# Patient Record
Sex: MALE | Race: BLACK OR AFRICAN AMERICAN | NOT HISPANIC OR LATINO | URBAN - METROPOLITAN AREA
[De-identification: names, ages, dates, MRNs, and addresses within clinical notes are randomized per-mention and may not be internally consistent; named-entity substitution may affect disease eponyms.]

---

## 2020-03-28 ENCOUNTER — EMERGENCY (EMERGENCY)
Facility: HOSPITAL | Age: 35
LOS: 1 days | Discharge: HOME | End: 2020-03-28
Attending: EMERGENCY MEDICINE
Payer: MEDICAID

## 2020-03-28 VITALS
OXYGEN SATURATION: 100 % | RESPIRATION RATE: 16 BRPM | SYSTOLIC BLOOD PRESSURE: 139 MMHG | DIASTOLIC BLOOD PRESSURE: 97 MMHG | TEMPERATURE: 99 F | HEART RATE: 88 BPM

## 2020-03-28 LAB
BASOPHILS # BLD AUTO: 0.03 K/UL — SIGNIFICANT CHANGE UP (ref 0–0.2)
BASOPHILS NFR BLD AUTO: 0.5 % — SIGNIFICANT CHANGE UP (ref 0–2)
EOSINOPHIL # BLD AUTO: 0 K/UL — SIGNIFICANT CHANGE UP (ref 0–0.5)
EOSINOPHIL NFR BLD AUTO: 0 % — SIGNIFICANT CHANGE UP (ref 0–6)
HCT VFR BLD CALC: 48 % — SIGNIFICANT CHANGE UP (ref 39–50)
HGB BLD-MCNC: 16.1 G/DL — SIGNIFICANT CHANGE UP (ref 13–17)
IMM GRANULOCYTES NFR BLD AUTO: 0.2 % — SIGNIFICANT CHANGE UP (ref 0–1.5)
LYMPHOCYTES # BLD AUTO: 1.57 K/UL — SIGNIFICANT CHANGE UP (ref 1–3.3)
LYMPHOCYTES # BLD AUTO: 27.3 % — SIGNIFICANT CHANGE UP (ref 13–44)
MCHC RBC-ENTMCNC: 27.3 PG — SIGNIFICANT CHANGE UP (ref 27–34)
MCHC RBC-ENTMCNC: 33.5 % — SIGNIFICANT CHANGE UP (ref 32–36)
MCV RBC AUTO: 81.4 FL — SIGNIFICANT CHANGE UP (ref 80–100)
MONOCYTES # BLD AUTO: 0.5 K/UL — SIGNIFICANT CHANGE UP (ref 0–0.9)
MONOCYTES NFR BLD AUTO: 8.7 % — SIGNIFICANT CHANGE UP (ref 2–14)
NEUTROPHILS # BLD AUTO: 3.64 K/UL — SIGNIFICANT CHANGE UP (ref 1.8–7.4)
NEUTROPHILS NFR BLD AUTO: 63.3 % — SIGNIFICANT CHANGE UP (ref 43–77)
NRBC # FLD: 0 K/UL — SIGNIFICANT CHANGE UP (ref 0–0)
PLATELET # BLD AUTO: 182 K/UL — SIGNIFICANT CHANGE UP (ref 150–400)
PMV BLD: 10.1 FL — SIGNIFICANT CHANGE UP (ref 7–13)
RBC # BLD: 5.9 M/UL — HIGH (ref 4.2–5.8)
RBC # FLD: 13.2 % — SIGNIFICANT CHANGE UP (ref 10.3–14.5)
WBC # BLD: 5.75 K/UL — SIGNIFICANT CHANGE UP (ref 3.8–10.5)
WBC # FLD AUTO: 5.75 K/UL — SIGNIFICANT CHANGE UP (ref 3.8–10.5)

## 2020-03-28 PROCEDURE — 71046 X-RAY EXAM CHEST 2 VIEWS: CPT | Mod: 26

## 2020-03-28 PROCEDURE — 93010 ELECTROCARDIOGRAM REPORT: CPT

## 2020-03-28 PROCEDURE — 99285 EMERGENCY DEPT VISIT HI MDM: CPT | Mod: 25

## 2020-03-28 RX ORDER — IBUPROFEN 200 MG
600 TABLET ORAL ONCE
Refills: 0 | Status: COMPLETED | OUTPATIENT
Start: 2020-03-28 | End: 2020-03-28

## 2020-03-28 RX ADMIN — Medication 600 MILLIGRAM(S): at 23:49

## 2020-03-28 NOTE — ED PROVIDER NOTE - NSFOLLOWUPCLINICS_GEN_ALL_ED_FT
[facility]  Cardiology  [address] [place] [id number]  Phone: [phone]  Fax:   Follow Up Time:

## 2020-03-28 NOTE — ED PROVIDER NOTE - PHYSICAL EXAMINATION
PHYSICAL EXAM:  GENERAL: non-toxic appearing; in no respiratory distress  HEAD: Atraumatic, Normocephalic;  NECK: No JVD; FROM  EYES: PERRL, EOMs intact b/l w/out deficits  CHEST/LUNG: CTAB no wheezes/rhonchi/rales  HEART: RRR no murmur/gallops/rubs  ABDOMEN: +BS, soft, NT, ND  EXTREMITIES: No LE edema, +2 radial pulses b/l  MUSCULOSKELETAL: FROM of all 4 extremities; tender to midsternum;   NERVOUS SYSTEM:  A&Ox3, No motor deficits or sensory deficits; CNII-XII intact; no focal neurologic deficits  SKIN:  No new rashes

## 2020-03-28 NOTE — ED PROVIDER NOTE - OBJECTIVE STATEMENT
35 yo M with no significant pmhx, presents to ED c/o midsternal CP x1 week, worsening since yesterday. pain is constant, non radiating, non exertional, worse w/ movement. pt also w/ rhinorrhea 2 days ago which has resolved. pt denies cough, fevers, chills, headache, nausea, vomiting, sob, diarrhea, abd pain, urinary sx, recent travel. pt admits to smoking marijuana daily

## 2020-03-28 NOTE — ED PROVIDER NOTE - ATTENDING CONTRIBUTION TO CARE
I performed a history and physical exam of the patient and discussed their management with the resident and /or advanced care provider. I reviewed the resident and /or ACP's note and agree with the documented findings and plan of care. My medical decision making and observations are found above.    .

## 2020-03-28 NOTE — ED PROVIDER NOTE - PATIENT PORTAL LINK FT
You can access the FollowMyHealth Patient Portal offered by [facility] by registering at the following website: [email or web address]. By joining [employer]’s FollowMyHealth portal, you will also be able to view your health information using other applications (apps) compatible with our system.

## 2020-03-28 NOTE — ED PROVIDER NOTE - NSFOLLOWUPINSTRUCTIONS_ED_ALL_ED_FT
Inferior and lateral T-wave inversion  T-wave inversion in the inferior and lateral leads is identified in 1.5–1.8% of adult and  athletes. However, T-wave inversions in the lateral leads are confined to males and have been identified in only 0.3%.11,14,15 In contrast, T-wave inversions are identified in 10% of black athletes (6% inferior leads and 4% lateral leads) but as with  athletes, T-wave inversion in the lateral leads is usually absent in black female athletes.    Although T-wave inversions in the lateral leads are identified in 4% of black athletes, we have observed two cases of aborted SCD in two young black football players with these repolarisation changes in the absence of a structural abnormality of the heart, indicating the presence of a subtle cardiomyopathy or an ion channel disorder. Comparison of the pattern of T-wave inversions in black athletes and black patients with HCM demonstrates that T-wave inversions in black HCM patients are usually confined to the lateral leads, being present in almost 80% of cases, whereas as T-wave inversion in leads V1–V4 is only observed in 3–4% of HCM patients compared with 12% of black athletes. More detailed genotype–phenotype studies are required in black athletes to elucidate the precise nature of marked repolarisation changes in the lateral leads.

## 2020-03-28 NOTE — ED PROVIDER NOTE - CLINICAL SUMMARY MEDICAL DECISION MAKING FREE TEXT BOX
Dick Lr MD. 34 M presents for midsternal CP x1 week, worsening today. denies sob, cough, fever, chills, n/v, radiation of cp. smokes marijuana daily. likely msk in nature, as pt is point tender to mid sternum. will do ekg, cxr, analgesia, reassess. Dick Lr MD. 34 M presents for midsternal CP x1 week, worsening today. denies sob, cough, fever, chills, n/v, radiation of cp. smokes marijuana daily. likely msk in nature, as pt is point tender to mid sternum. will do ekg, cxr, labs including trop, analgesia, reassess. Dick Lr MD. 34 M presents for midsternal CP x1 week, worsening today. denies sob, cough, fever, chills, n/v, radiation of cp. smokes marijuana daily. likely msk in nature, as pt is point tender to mid sternum. will do ekg, cxr, labs including trop, analgesia, reassess.    Haughey: pt looks well with mild left sided cp, preceded by pain to t4 region previously, smokes mj daily, looks incredibly well, low troponin, chest film normal.  given low trop will have pt f/u with outpatient cardioplogy.  given his ecg so he has a copy.

## 2020-03-29 LAB
ALBUMIN SERPL ELPH-MCNC: 4.3 G/DL — SIGNIFICANT CHANGE UP (ref 3.3–5)
ALP SERPL-CCNC: 47 U/L — SIGNIFICANT CHANGE UP (ref 40–120)
ALT FLD-CCNC: 12 U/L — SIGNIFICANT CHANGE UP (ref 4–41)
ANION GAP SERPL CALC-SCNC: 13 MMO/L — SIGNIFICANT CHANGE UP (ref 7–14)
AST SERPL-CCNC: 16 U/L — SIGNIFICANT CHANGE UP (ref 4–40)
BILIRUB SERPL-MCNC: 0.7 MG/DL — SIGNIFICANT CHANGE UP (ref 0.2–1.2)
BUN SERPL-MCNC: 13 MG/DL — SIGNIFICANT CHANGE UP (ref 7–23)
CALCIUM SERPL-MCNC: 9.5 MG/DL — SIGNIFICANT CHANGE UP (ref 8.4–10.5)
CHLORIDE SERPL-SCNC: 101 MMOL/L — SIGNIFICANT CHANGE UP (ref 98–107)
CO2 SERPL-SCNC: 23 MMOL/L — SIGNIFICANT CHANGE UP (ref 22–31)
CREAT SERPL-MCNC: 0.93 MG/DL — SIGNIFICANT CHANGE UP (ref 0.5–1.3)
GLUCOSE SERPL-MCNC: 83 MG/DL — SIGNIFICANT CHANGE UP (ref 70–99)
POTASSIUM SERPL-MCNC: 4.2 MMOL/L — SIGNIFICANT CHANGE UP (ref 3.5–5.3)
POTASSIUM SERPL-SCNC: 4.2 MMOL/L — SIGNIFICANT CHANGE UP (ref 3.5–5.3)
PROT SERPL-MCNC: 8.3 G/DL — SIGNIFICANT CHANGE UP (ref 6–8.3)
SODIUM SERPL-SCNC: 137 MMOL/L — SIGNIFICANT CHANGE UP (ref 135–145)
TROPONIN T, HIGH SENSITIVITY: < 6 NG/L — SIGNIFICANT CHANGE UP (ref ?–14)

## 2020-04-01 ENCOUNTER — EMERGENCY (EMERGENCY)
Facility: HOSPITAL | Age: 35
LOS: 1 days | Discharge: HOME | End: 2020-04-01
Attending: EMERGENCY MEDICINE | Admitting: EMERGENCY MEDICINE
Payer: MEDICAID

## 2020-04-01 VITALS
HEART RATE: 76 BPM | SYSTOLIC BLOOD PRESSURE: 157 MMHG | DIASTOLIC BLOOD PRESSURE: 99 MMHG | OXYGEN SATURATION: 100 % | RESPIRATION RATE: 16 BRPM | TEMPERATURE: 98 F

## 2020-04-01 VITALS
OXYGEN SATURATION: 100 % | DIASTOLIC BLOOD PRESSURE: 89 MMHG | HEART RATE: 63 BPM | RESPIRATION RATE: 18 BRPM | TEMPERATURE: 98 F | SYSTOLIC BLOOD PRESSURE: 120 MMHG

## 2020-04-01 LAB
ALBUMIN SERPL ELPH-MCNC: 3.7 G/DL — SIGNIFICANT CHANGE UP (ref 3.3–5)
ALP SERPL-CCNC: 45 U/L — SIGNIFICANT CHANGE UP (ref 40–120)
ALT FLD-CCNC: 18 U/L — SIGNIFICANT CHANGE UP (ref 4–41)
ANION GAP SERPL CALC-SCNC: 11 MMO/L — SIGNIFICANT CHANGE UP (ref 7–14)
AST SERPL-CCNC: 16 U/L — SIGNIFICANT CHANGE UP (ref 4–40)
BASOPHILS # BLD AUTO: 0.02 K/UL — SIGNIFICANT CHANGE UP (ref 0–0.2)
BASOPHILS NFR BLD AUTO: 0.5 % — SIGNIFICANT CHANGE UP (ref 0–2)
BILIRUB SERPL-MCNC: 0.2 MG/DL — SIGNIFICANT CHANGE UP (ref 0.2–1.2)
BUN SERPL-MCNC: 16 MG/DL — SIGNIFICANT CHANGE UP (ref 7–23)
CALCIUM SERPL-MCNC: 9.3 MG/DL — SIGNIFICANT CHANGE UP (ref 8.4–10.5)
CHLORIDE SERPL-SCNC: 101 MMOL/L — SIGNIFICANT CHANGE UP (ref 98–107)
CO2 SERPL-SCNC: 24 MMOL/L — SIGNIFICANT CHANGE UP (ref 22–31)
CREAT SERPL-MCNC: 0.92 MG/DL — SIGNIFICANT CHANGE UP (ref 0.5–1.3)
EOSINOPHIL # BLD AUTO: 0 K/UL — SIGNIFICANT CHANGE UP (ref 0–0.5)
EOSINOPHIL NFR BLD AUTO: 0 % — SIGNIFICANT CHANGE UP (ref 0–6)
GLUCOSE SERPL-MCNC: 128 MG/DL — HIGH (ref 70–99)
HCT VFR BLD CALC: 42.8 % — SIGNIFICANT CHANGE UP (ref 39–50)
HGB BLD-MCNC: 14.3 G/DL — SIGNIFICANT CHANGE UP (ref 13–17)
IMM GRANULOCYTES NFR BLD AUTO: 0.2 % — SIGNIFICANT CHANGE UP (ref 0–1.5)
LYMPHOCYTES # BLD AUTO: 1.6 K/UL — SIGNIFICANT CHANGE UP (ref 1–3.3)
LYMPHOCYTES # BLD AUTO: 37.3 % — SIGNIFICANT CHANGE UP (ref 13–44)
MCHC RBC-ENTMCNC: 27.6 PG — SIGNIFICANT CHANGE UP (ref 27–34)
MCHC RBC-ENTMCNC: 33.4 % — SIGNIFICANT CHANGE UP (ref 32–36)
MCV RBC AUTO: 82.6 FL — SIGNIFICANT CHANGE UP (ref 80–100)
MONOCYTES # BLD AUTO: 0.47 K/UL — SIGNIFICANT CHANGE UP (ref 0–0.9)
MONOCYTES NFR BLD AUTO: 11 % — SIGNIFICANT CHANGE UP (ref 2–14)
NEUTROPHILS # BLD AUTO: 2.19 K/UL — SIGNIFICANT CHANGE UP (ref 1.8–7.4)
NEUTROPHILS NFR BLD AUTO: 51 % — SIGNIFICANT CHANGE UP (ref 43–77)
NRBC # FLD: 0 K/UL — SIGNIFICANT CHANGE UP (ref 0–0)
PLATELET # BLD AUTO: 266 K/UL — SIGNIFICANT CHANGE UP (ref 150–400)
PMV BLD: 9.3 FL — SIGNIFICANT CHANGE UP (ref 7–13)
POTASSIUM SERPL-MCNC: 4 MMOL/L — SIGNIFICANT CHANGE UP (ref 3.5–5.3)
POTASSIUM SERPL-SCNC: 4 MMOL/L — SIGNIFICANT CHANGE UP (ref 3.5–5.3)
PROT SERPL-MCNC: 7.1 G/DL — SIGNIFICANT CHANGE UP (ref 6–8.3)
RBC # BLD: 5.18 M/UL — SIGNIFICANT CHANGE UP (ref 4.2–5.8)
RBC # FLD: 12.8 % — SIGNIFICANT CHANGE UP (ref 10.3–14.5)
SODIUM SERPL-SCNC: 136 MMOL/L — SIGNIFICANT CHANGE UP (ref 135–145)
TROPONIN T, HIGH SENSITIVITY: < 6 NG/L — SIGNIFICANT CHANGE UP (ref ?–14)
WBC # BLD: 4.29 K/UL — SIGNIFICANT CHANGE UP (ref 3.8–10.5)
WBC # FLD AUTO: 4.29 K/UL — SIGNIFICANT CHANGE UP (ref 3.8–10.5)

## 2020-04-01 PROCEDURE — 99285 EMERGENCY DEPT VISIT HI MDM: CPT

## 2020-04-01 PROCEDURE — 71046 X-RAY EXAM CHEST 2 VIEWS: CPT | Mod: 26

## 2020-04-01 RX ORDER — METOCLOPRAMIDE HCL 10 MG
10 TABLET ORAL ONCE
Refills: 0 | Status: COMPLETED | OUTPATIENT
Start: 2020-04-01 | End: 2020-04-01

## 2020-04-01 RX ORDER — ACETAMINOPHEN 500 MG
650 TABLET ORAL ONCE
Refills: 0 | Status: COMPLETED | OUTPATIENT
Start: 2020-04-01 | End: 2020-04-01

## 2020-04-01 RX ADMIN — Medication 650 MILLIGRAM(S): at 10:31

## 2020-04-01 RX ADMIN — Medication 10 MILLIGRAM(S): at 10:31

## 2020-04-01 NOTE — ED PROVIDER NOTE - PATIENT PORTAL LINK FT
You can access the FollowMyHealth Patient Portal offered by [facility] by registering at the following website: [email or web address]. By joining [employer]s FollowMyHealth portal, you will also be able to view your health information using other applications (apps) compatible with our system.

## 2020-04-01 NOTE — ED ADULT NURSE NOTE - OBJECTIVE STATEMENT
Pt c/o headache and CP. denies SOB, states last week had some nasal congestion and mild abdominal pain. 20 g line placed in RT Ac. labs sent, medicated as per order.

## 2020-04-01 NOTE — ED PROVIDER NOTE - PROGRESS NOTE DETAILS
MERA Fonseca: pt states that his symptoms have improved and currently does not have the headache or chest pain.

## 2020-04-01 NOTE — ED PROVIDER NOTE - ATTENDING CONTRIBUTION TO CARE
I performed a face to face evaluation of this patient and obtained a history and performed a full exam.  I agree with the history, physical exam and plan of the PA.    33 yo male no sig pmh presents to ED c/o headache and chest pain worsening over the past few days.  Previous visit recently with neg workup.  Exam aaox3, neck supple, no focal neuro deficit, lungs ctab, heart sounds no murmur or rub, no le swelling.  Low c/f sah as ha non-acute onset, not maximal in onset.  No meningismus. Low c/f PE, perc neg.  Low c/f acs as cp non-exertional, non-radiating, no nausea or diaphoresis.  Ekg c/w pericarditis vs. early repol.  Unable to compare to prior ekg.  Plan for cxr, labs, cxr, iv reglan, tylenol and reassess.  Dispo pending.

## 2020-04-01 NOTE — ED PROVIDER NOTE - CLINICAL SUMMARY MEDICAL DECISION MAKING FREE TEXT BOX
33 yo male c no sig pmhx presents to ED c/o headache and chest pain worsening over the past few days.   2nd visit to ED for same, r/o tension headache, atypical CP, will repeat ekg, cxr, labs, cxr, iv reglan, tylenol and reassess

## 2020-04-01 NOTE — ED PROVIDER NOTE - NSFOLLOWUPINSTRUCTIONS_ED_ALL_ED_FT
Rest, drink plenty of fluids.  Advance activity as tolerated.  Continue all previously prescribed medications as directed.  Follow up with your primary care physician in 48-72 hours- bring copies of your results.  Return to the ER for worsening or persistent symptoms, and/or ANY NEW OR CONCERNING SYMPTOMS. If you have issues obtaining follow up, please call: [phone] to obtain a doctor or specialist who takes your insurance in your area.  You may call [phone] to make an appointment with the internal medicine clinic.    Please take Ibuprofen 600mg every 8 hours for 7 days

## 2020-04-01 NOTE — ED ADULT TRIAGE NOTE - CHIEF COMPLAINT QUOTE
Pt c/o midsternal CP and HA x 3 days. States he was seen yesterday and referred to a cardiologist. Denies SOB, cough, fever/chills, N, V, vision changes. Denies pmhx

## 2020-04-01 NOTE — ED PROVIDER NOTE - OBJECTIVE STATEMENT
35 yo male c no sig pmhx presents to ED c/o headache and chest pain worsening over the past few days.  Pt was seen in ED 3 days ago for same symptoms, had a normal cxr and labs and was advised to follow up with a cardiologist.  Pt states he has been feeling worse and his headache kept him up all night.  Pt has not tried taking anything for the headache.  headache frontal region and notes neck discomfort as well.  Pt notes CP is substernal non radiating 5/10.   Pt notes has some nasal congestion.  Denies any fever, n/v, weakness, numbness, sob, abd pain, back pain.

## 2020-10-13 ENCOUNTER — EMERGENCY (EMERGENCY)
Facility: HOSPITAL | Age: 35
LOS: 0 days | Discharge: HOME | End: 2020-10-13
Attending: EMERGENCY MEDICINE
Payer: MEDICAID

## 2020-10-13 VITALS
OXYGEN SATURATION: 97 % | DIASTOLIC BLOOD PRESSURE: 84 MMHG | HEART RATE: 60 BPM | RESPIRATION RATE: 16 BRPM | SYSTOLIC BLOOD PRESSURE: 142 MMHG | TEMPERATURE: 97 F

## 2020-10-13 VITALS — WEIGHT: 160.06 LBS | HEART RATE: 88 BPM | OXYGEN SATURATION: 98 % | RESPIRATION RATE: 20 BRPM | HEIGHT: 68 IN

## 2020-10-13 DIAGNOSIS — R10.9 UNSPECIFIED ABDOMINAL PAIN: ICD-10-CM

## 2020-10-13 DIAGNOSIS — Z88.8 ALLERGY STATUS TO OTHER DRUGS, MEDICAMENTS AND BIOLOGICAL SUBSTANCES: ICD-10-CM

## 2020-10-13 DIAGNOSIS — R11.2 NAUSEA WITH VOMITING, UNSPECIFIED: ICD-10-CM

## 2020-10-13 DIAGNOSIS — N23 UNSPECIFIED RENAL COLIC: ICD-10-CM

## 2020-10-13 LAB
ALBUMIN SERPL ELPH-MCNC: 3.5 G/DL — SIGNIFICANT CHANGE UP (ref 3.3–5)
ALP SERPL-CCNC: 51 U/L — SIGNIFICANT CHANGE UP (ref 40–120)
ALT FLD-CCNC: 20 U/L — SIGNIFICANT CHANGE UP (ref 12–78)
ANION GAP SERPL CALC-SCNC: 8 MMOL/L — SIGNIFICANT CHANGE UP (ref 5–17)
AST SERPL-CCNC: 13 U/L — LOW (ref 15–37)
BASOPHILS # BLD AUTO: 0.02 K/UL — SIGNIFICANT CHANGE UP (ref 0–0.2)
BASOPHILS NFR BLD AUTO: 0.3 % — SIGNIFICANT CHANGE UP (ref 0–2)
BILIRUB SERPL-MCNC: 0.4 MG/DL — SIGNIFICANT CHANGE UP (ref 0.2–1.2)
BUN SERPL-MCNC: 17 MG/DL — SIGNIFICANT CHANGE UP (ref 7–23)
CALCIUM SERPL-MCNC: 8.4 MG/DL — LOW (ref 8.5–10.1)
CHLORIDE SERPL-SCNC: 106 MMOL/L — SIGNIFICANT CHANGE UP (ref 96–108)
CO2 SERPL-SCNC: 25 MMOL/L — SIGNIFICANT CHANGE UP (ref 22–31)
CREAT SERPL-MCNC: 1.17 MG/DL — SIGNIFICANT CHANGE UP (ref 0.5–1.3)
EOSINOPHIL # BLD AUTO: 0 K/UL — SIGNIFICANT CHANGE UP (ref 0–0.5)
EOSINOPHIL NFR BLD AUTO: 0 % — SIGNIFICANT CHANGE UP (ref 0–6)
GLUCOSE SERPL-MCNC: 134 MG/DL — HIGH (ref 70–99)
HCT VFR BLD CALC: 44.9 % — SIGNIFICANT CHANGE UP (ref 39–50)
HGB BLD-MCNC: 15.1 G/DL — SIGNIFICANT CHANGE UP (ref 13–17)
IMM GRANULOCYTES NFR BLD AUTO: 0.3 % — SIGNIFICANT CHANGE UP (ref 0–1.5)
LACTATE SERPL-SCNC: 1.7 MMOL/L — SIGNIFICANT CHANGE UP (ref 0.7–2)
LIDOCAIN IGE QN: 152 U/L — SIGNIFICANT CHANGE UP (ref 73–393)
LYMPHOCYTES # BLD AUTO: 2.15 K/UL — SIGNIFICANT CHANGE UP (ref 1–3.3)
LYMPHOCYTES # BLD AUTO: 29.3 % — SIGNIFICANT CHANGE UP (ref 13–44)
MCHC RBC-ENTMCNC: 26.6 PG — LOW (ref 27–34)
MCHC RBC-ENTMCNC: 33.6 GM/DL — SIGNIFICANT CHANGE UP (ref 32–36)
MCV RBC AUTO: 79 FL — LOW (ref 80–100)
MONOCYTES # BLD AUTO: 0.56 K/UL — SIGNIFICANT CHANGE UP (ref 0–0.9)
MONOCYTES NFR BLD AUTO: 7.6 % — SIGNIFICANT CHANGE UP (ref 2–14)
NEUTROPHILS # BLD AUTO: 4.59 K/UL — SIGNIFICANT CHANGE UP (ref 1.8–7.4)
NEUTROPHILS NFR BLD AUTO: 62.5 % — SIGNIFICANT CHANGE UP (ref 43–77)
NRBC # BLD: 0 /100 WBCS — SIGNIFICANT CHANGE UP (ref 0–0)
PLATELET # BLD AUTO: 214 K/UL — SIGNIFICANT CHANGE UP (ref 150–400)
POTASSIUM SERPL-MCNC: 3.3 MMOL/L — LOW (ref 3.5–5.3)
POTASSIUM SERPL-SCNC: 3.3 MMOL/L — LOW (ref 3.5–5.3)
PROT SERPL-MCNC: 7.5 GM/DL — SIGNIFICANT CHANGE UP (ref 6–8.3)
RBC # BLD: 5.68 M/UL — SIGNIFICANT CHANGE UP (ref 4.2–5.8)
RBC # FLD: 14.3 % — SIGNIFICANT CHANGE UP (ref 10.3–14.5)
SODIUM SERPL-SCNC: 139 MMOL/L — SIGNIFICANT CHANGE UP (ref 135–145)
WBC # BLD: 7.34 K/UL — SIGNIFICANT CHANGE UP (ref 3.8–10.5)
WBC # FLD AUTO: 7.34 K/UL — SIGNIFICANT CHANGE UP (ref 3.8–10.5)

## 2020-10-13 PROCEDURE — G1004: CPT

## 2020-10-13 PROCEDURE — 99285 EMERGENCY DEPT VISIT HI MDM: CPT

## 2020-10-13 PROCEDURE — 74176 CT ABD & PELVIS W/O CONTRAST: CPT | Mod: 26,MG

## 2020-10-13 RX ORDER — OXYCODONE AND ACETAMINOPHEN 5; 325 MG/1; MG/1
1 TABLET ORAL ONCE
Refills: 0 | Status: DISCONTINUED | OUTPATIENT
Start: 2020-10-13 | End: 2020-10-13

## 2020-10-13 RX ORDER — SODIUM CHLORIDE 9 MG/ML
2300 INJECTION INTRAMUSCULAR; INTRAVENOUS; SUBCUTANEOUS ONCE
Refills: 0 | Status: COMPLETED | OUTPATIENT
Start: 2020-10-13 | End: 2020-10-13

## 2020-10-13 RX ORDER — ONDANSETRON 8 MG/1
4 TABLET, FILM COATED ORAL ONCE
Refills: 0 | Status: COMPLETED | OUTPATIENT
Start: 2020-10-13 | End: 2020-10-13

## 2020-10-13 RX ORDER — IBUPROFEN 200 MG
1 TABLET ORAL
Qty: 20 | Refills: 0
Start: 2020-10-13 | End: 2020-10-17

## 2020-10-13 RX ORDER — OXYCODONE AND ACETAMINOPHEN 5; 325 MG/1; MG/1
1 TABLET ORAL
Qty: 20 | Refills: 0
Start: 2020-10-13 | End: 2020-10-17

## 2020-10-13 RX ORDER — KETOROLAC TROMETHAMINE 30 MG/ML
30 SYRINGE (ML) INJECTION ONCE
Refills: 0 | Status: DISCONTINUED | OUTPATIENT
Start: 2020-10-13 | End: 2020-10-13

## 2020-10-13 RX ADMIN — Medication 30 MILLIGRAM(S): at 17:32

## 2020-10-13 RX ADMIN — OXYCODONE AND ACETAMINOPHEN 1 TABLET(S): 5; 325 TABLET ORAL at 23:00

## 2020-10-13 RX ADMIN — ONDANSETRON 4 MILLIGRAM(S): 8 TABLET, FILM COATED ORAL at 17:19

## 2020-10-13 RX ADMIN — SODIUM CHLORIDE 2300 MILLILITER(S): 9 INJECTION INTRAMUSCULAR; INTRAVENOUS; SUBCUTANEOUS at 17:19

## 2020-10-13 RX ADMIN — OXYCODONE AND ACETAMINOPHEN 1 TABLET(S): 5; 325 TABLET ORAL at 22:20

## 2020-10-13 NOTE — ED ADULT TRIAGE NOTE - CHIEF COMPLAINT QUOTE
Pt c/o left side abdominal pain , with vomiting that started last night , pt denies medical hx, diarrhea, fever and chills. pt threw up in triage, not cooperative unable to get bp in triage

## 2020-10-13 NOTE — ED PROVIDER NOTE - CARE PROVIDER_API CALL
SKYLER WILKINS  UROLOGY  [address], [address]  [place] [address]  Phone: [phone]  Fax: [phone]  Follow Up Time: 4-6 Days

## 2020-10-13 NOTE — ED ADULT NURSE NOTE - CINV DISCH TEACH PARTICIP
CAD (coronary artery disease)    Diabetes    Hyperlipidemia    Hypertension    PVD (peripheral vascular disease)
Patient

## 2020-10-13 NOTE — ED PROVIDER NOTE - CARE PROVIDERS DIRECT ADDRESSES
[email or web address]

## 2020-10-13 NOTE — ED ADULT NURSE REASSESSMENT NOTE - NS ED NURSE REASSESS COMMENT FT1
Assumed care at 1900, pt asleep on stretcher but easily arousable. Reassessed at 2100 and reports pain is relieved with no movement and has no complaints at this time. Assessments ongoing.

## 2020-10-13 NOTE — ED PROVIDER NOTE - PATIENT PORTAL LINK FT
You can access the FollowMyHealth Patient Portal offered by [facility] by registering at the following website: [email or web address]. By joining [employer]’s FollowMyHealth portal, you will also be able to view your health information using other applications (apps) compatible with our system.

## 2020-10-13 NOTE — ED PROVIDER NOTE - PHYSICAL EXAMINATION
Collins:  General: No distress.  Mentation at baseline.   HEENT: WNL  Chest/Lungs: CTAB, No wheeze, No retractions, No increased work of breathing, Normal rate  Heart: S1S2 RRR, No M/R/G, Pules equal Bilaterally in upper and lower extremities distally  Abd: soft, NT/ND, No guarding, No rebound.  No hernias, no palpable masses.  Extrem: FROM in all joints, no significant edema noted, No ulcers.  Cap refil < 2sec.  Skin: No rash noted, warm dry.  Neuro:  Grossly normal.  No difficulty ambulating. No focal deficits.  Psychiatric: No evidence of delusions. No SI/HI.

## 2021-04-22 ENCOUNTER — EMERGENCY (EMERGENCY)
Facility: HOSPITAL | Age: 36
LOS: 1 days | Discharge: HOME | End: 2021-04-22
Attending: STUDENT IN AN ORGANIZED HEALTH CARE EDUCATION/TRAINING PROGRAM | Admitting: STUDENT IN AN ORGANIZED HEALTH CARE EDUCATION/TRAINING PROGRAM
Payer: MEDICAID

## 2021-04-22 VITALS
DIASTOLIC BLOOD PRESSURE: 96 MMHG | SYSTOLIC BLOOD PRESSURE: 117 MMHG | RESPIRATION RATE: 18 BRPM | OXYGEN SATURATION: 99 % | TEMPERATURE: 98 F | HEART RATE: 82 BPM | HEIGHT: 68 IN

## 2021-04-22 VITALS
DIASTOLIC BLOOD PRESSURE: 88 MMHG | OXYGEN SATURATION: 98 % | RESPIRATION RATE: 16 BRPM | TEMPERATURE: 99 F | HEART RATE: 79 BPM | SYSTOLIC BLOOD PRESSURE: 149 MMHG

## 2021-04-22 PROCEDURE — 99284 EMERGENCY DEPT VISIT MOD MDM: CPT

## 2021-04-22 RX ORDER — IBUPROFEN 200 MG
600 TABLET ORAL ONCE
Refills: 0 | Status: COMPLETED | OUTPATIENT
Start: 2021-04-22 | End: 2021-04-22

## 2021-04-22 RX ORDER — DEXAMETHASONE 0.5 MG/5ML
10 ELIXIR ORAL ONCE
Refills: 0 | Status: COMPLETED | OUTPATIENT
Start: 2021-04-22 | End: 2021-04-22

## 2021-04-22 RX ORDER — DEXAMETHASONE 0.5 MG/5ML
10 ELIXIR ORAL ONCE
Refills: 0 | Status: DISCONTINUED | OUTPATIENT
Start: 2021-04-22 | End: 2021-04-22

## 2021-04-22 RX ADMIN — Medication 600 MILLIGRAM(S): at 22:03

## 2021-04-22 RX ADMIN — Medication 10 MILLIGRAM(S): at 22:17

## 2021-04-22 NOTE — ED PROVIDER NOTE - PHYSICAL EXAMINATION
GEN: NAD, awake, well appearing  HEENT: enlarged b/l tonsils, posterior oropharynx with erythema, no exudate, uvula midline   CHEST/LUNGS: Non-tachypneic, CTAB, bilateral breath sounds  CARDIAC: Non-tachycardic, s1s2, normal perfusion, no peripheral edema  ABDOMEN: Soft, NTND, No rebound/guarding  MSK: No joint tenderness, no gross deformity of extremities  SKIN: No rashes, no petechiae, no vesicles  NEURO: CN grossly intact, normal coordination, no focal motor or sensory deficits  PSYCH: Alert, appropriate, cooperative

## 2021-04-22 NOTE — ED PROVIDER NOTE - PATIENT PORTAL LINK FT
You can access the FollowMyHealth Patient Portal offered by [facility] by registering at the following website: [email or web address]. By joining [employer]’s FollowMyHealth portal, you will also be able to view your health information using other applications (apps) compatible with our system.

## 2021-04-22 NOTE — ED PROVIDER NOTE - NSFOLLOWUPINSTRUCTIONS_ED_ALL_ED_FT
Please follow up with your primary care doctor.  Take motrin 600mg every 6 hours as needed for pain.  Your COVID swab results will be texted to you with the phone # your provided when they result.   Return to the Emergency Department for any new or worsening symptoms

## 2021-04-22 NOTE — ED PROVIDER NOTE - CLINICAL SUMMARY MEDICAL DECISION MAKING FREE TEXT BOX
35M presenting with pharyngitis likely 2/2 viral etiology vs irritation from smoking. Will swab for covid given attendance to large event and treat symptomatically. Patient well appearing, no respiratory distress.

## 2021-04-22 NOTE — ED PROVIDER NOTE - OBJECTIVE STATEMENT
35M with no pmh presenting with 2 days of sore throat and generalized malaise. States that symptoms started after attending "420 marijuana smoking event" where he smoked all day and was exposed to a lot of second hand smoking. Denies fever, chills, cp, sob, nausea, vomiting, diarrhea, dysuria, headache

## 2021-04-22 NOTE — ED PROVIDER NOTE - NS ED ROS FT
GENERAL: No fever or chills  EYES: no change in vision  HEENT: sore throat   CARDIAC: no chest pain or palpitations  PULMONARY: no cough or SOB  GI: no abdominal pain, nausea, vomiting, diarrhea, or constipation   : No changes in urination  SKIN: no rashes  NEURO: no headache, numbness, or weakness  MSK: No joint pain

## 2021-04-22 NOTE — ED PROVIDER NOTE - NSFOLLOWUPCLINICS_GEN_ALL_ED_FT
[facility] - ENT  Otolaryngology (ENT)  [address] [place] [address]  Phone: [phone]  Fax:

## 2021-04-23 LAB — SARS-COV-2 RNA SPEC QL NAA+PROBE: SIGNIFICANT CHANGE UP

## 2021-04-26 ENCOUNTER — EMERGENCY (EMERGENCY)
Facility: HOSPITAL | Age: 36
LOS: 1 days | Discharge: HOME | End: 2021-04-26
Admitting: EMERGENCY MEDICINE
Payer: MEDICAID

## 2021-04-26 VITALS
TEMPERATURE: 99 F | OXYGEN SATURATION: 99 % | HEIGHT: 68 IN | DIASTOLIC BLOOD PRESSURE: 115 MMHG | SYSTOLIC BLOOD PRESSURE: 152 MMHG | HEART RATE: 95 BPM | RESPIRATION RATE: 18 BRPM

## 2021-04-26 DIAGNOSIS — J03.90 ACUTE TONSILLITIS, UNSPECIFIED: ICD-10-CM

## 2021-04-26 LAB
ALBUMIN SERPL ELPH-MCNC: 4.4 G/DL — SIGNIFICANT CHANGE UP (ref 3.3–5)
ALP SERPL-CCNC: 73 U/L — SIGNIFICANT CHANGE UP (ref 40–120)
ALT FLD-CCNC: 12 U/L — SIGNIFICANT CHANGE UP (ref 4–41)
ANION GAP SERPL CALC-SCNC: 9 MMOL/L — SIGNIFICANT CHANGE UP (ref 7–14)
APTT BLD: 32.1 SEC — SIGNIFICANT CHANGE UP (ref 27–36.3)
AST SERPL-CCNC: 12 U/L — SIGNIFICANT CHANGE UP (ref 4–40)
BASOPHILS # BLD AUTO: 0 K/UL — SIGNIFICANT CHANGE UP (ref 0–0.2)
BASOPHILS NFR BLD AUTO: 0 % — SIGNIFICANT CHANGE UP (ref 0–2)
BILIRUB SERPL-MCNC: 0.6 MG/DL — SIGNIFICANT CHANGE UP (ref 0.2–1.2)
BUN SERPL-MCNC: 17 MG/DL — SIGNIFICANT CHANGE UP (ref 7–23)
CALCIUM SERPL-MCNC: 9.6 MG/DL — SIGNIFICANT CHANGE UP (ref 8.4–10.5)
CHLORIDE SERPL-SCNC: 101 MMOL/L — SIGNIFICANT CHANGE UP (ref 98–107)
CO2 SERPL-SCNC: 28 MMOL/L — SIGNIFICANT CHANGE UP (ref 22–31)
CREAT SERPL-MCNC: 0.95 MG/DL — SIGNIFICANT CHANGE UP (ref 0.5–1.3)
EOSINOPHIL # BLD AUTO: 0.25 K/UL — SIGNIFICANT CHANGE UP (ref 0–0.5)
EOSINOPHIL NFR BLD AUTO: 2.7 % — SIGNIFICANT CHANGE UP (ref 0–6)
GLUCOSE SERPL-MCNC: 102 MG/DL — HIGH (ref 70–99)
HCT VFR BLD CALC: 49.1 % — SIGNIFICANT CHANGE UP (ref 39–50)
HGB BLD-MCNC: 16.1 G/DL — SIGNIFICANT CHANGE UP (ref 13–17)
IANC: 5.89 K/UL — SIGNIFICANT CHANGE UP (ref 1.5–8.5)
INR BLD: 1.18 RATIO — HIGH (ref 0.88–1.16)
LYMPHOCYTES # BLD AUTO: 1.38 K/UL — SIGNIFICANT CHANGE UP (ref 1–3.3)
LYMPHOCYTES # BLD AUTO: 15 % — SIGNIFICANT CHANGE UP (ref 13–44)
MCHC RBC-ENTMCNC: 27 PG — SIGNIFICANT CHANGE UP (ref 27–34)
MCHC RBC-ENTMCNC: 32.8 GM/DL — SIGNIFICANT CHANGE UP (ref 32–36)
MCV RBC AUTO: 82.2 FL — SIGNIFICANT CHANGE UP (ref 80–100)
MONOCYTES # BLD AUTO: 0.89 K/UL — SIGNIFICANT CHANGE UP (ref 0–0.9)
MONOCYTES NFR BLD AUTO: 9.7 % — SIGNIFICANT CHANGE UP (ref 2–14)
NEUTROPHILS # BLD AUTO: 5.61 K/UL — SIGNIFICANT CHANGE UP (ref 1.8–7.4)
NEUTROPHILS NFR BLD AUTO: 61.1 % — SIGNIFICANT CHANGE UP (ref 43–77)
PLATELET # BLD AUTO: 235 K/UL — SIGNIFICANT CHANGE UP (ref 150–400)
POTASSIUM SERPL-MCNC: 3.9 MMOL/L — SIGNIFICANT CHANGE UP (ref 3.5–5.3)
POTASSIUM SERPL-SCNC: 3.9 MMOL/L — SIGNIFICANT CHANGE UP (ref 3.5–5.3)
PROT SERPL-MCNC: 8 G/DL — SIGNIFICANT CHANGE UP (ref 6–8.3)
PROTHROM AB SERPL-ACNC: 13.4 SEC — SIGNIFICANT CHANGE UP (ref 10.6–13.6)
RBC # BLD: 5.97 M/UL — HIGH (ref 4.2–5.8)
RBC # FLD: 13.7 % — SIGNIFICANT CHANGE UP (ref 10.3–14.5)
SARS-COV-2 RNA SPEC QL NAA+PROBE: SIGNIFICANT CHANGE UP
SODIUM SERPL-SCNC: 138 MMOL/L — SIGNIFICANT CHANGE UP (ref 135–145)
WBC # BLD: 9.18 K/UL — SIGNIFICANT CHANGE UP (ref 3.8–10.5)
WBC # FLD AUTO: 9.18 K/UL — SIGNIFICANT CHANGE UP (ref 3.8–10.5)

## 2021-04-26 PROCEDURE — 99283 EMERGENCY DEPT VISIT LOW MDM: CPT

## 2021-04-26 PROCEDURE — 99218: CPT

## 2021-04-26 PROCEDURE — 70491 CT SOFT TISSUE NECK W/DYE: CPT | Mod: 26

## 2021-04-26 RX ORDER — ACETAMINOPHEN 500 MG
1000 TABLET ORAL ONCE
Refills: 0 | Status: COMPLETED | OUTPATIENT
Start: 2021-04-26 | End: 2021-04-26

## 2021-04-26 RX ORDER — KETOROLAC TROMETHAMINE 30 MG/ML
15 SYRINGE (ML) INJECTION EVERY 6 HOURS
Refills: 0 | Status: COMPLETED | OUTPATIENT
Start: 2021-04-26 | End: 2021-05-01

## 2021-04-26 RX ORDER — DEXAMETHASONE 0.5 MG/5ML
10 ELIXIR ORAL EVERY 8 HOURS
Refills: 0 | Status: COMPLETED | OUTPATIENT
Start: 2021-04-26 | End: 2021-04-27

## 2021-04-26 RX ORDER — LIDOCAINE 4 G/100G
15 CREAM TOPICAL ONCE
Refills: 0 | Status: COMPLETED | OUTPATIENT
Start: 2021-04-26 | End: 2021-04-26

## 2021-04-26 RX ORDER — DEXAMETHASONE 0.5 MG/5ML
10 ELIXIR ORAL ONCE
Refills: 0 | Status: COMPLETED | OUTPATIENT
Start: 2021-04-26 | End: 2021-04-26

## 2021-04-26 RX ADMIN — Medication 900 MILLIGRAM(S): at 12:19

## 2021-04-26 RX ADMIN — Medication 100 MILLIGRAM(S): at 11:23

## 2021-04-26 RX ADMIN — Medication 30 MILLILITER(S): at 19:40

## 2021-04-26 RX ADMIN — Medication 10 MILLIGRAM(S): at 12:19

## 2021-04-26 RX ADMIN — Medication 102 MILLIGRAM(S): at 18:15

## 2021-04-26 RX ADMIN — LIDOCAINE 15 MILLILITER(S): 4 CREAM TOPICAL at 10:18

## 2021-04-26 RX ADMIN — Medication 102 MILLIGRAM(S): at 09:41

## 2021-04-26 RX ADMIN — Medication 15 MILLIGRAM(S): at 15:52

## 2021-04-26 RX ADMIN — Medication 100 MILLIGRAM(S): at 20:13

## 2021-04-26 RX ADMIN — Medication 1000 MILLIGRAM(S): at 12:19

## 2021-04-26 RX ADMIN — Medication 15 MILLIGRAM(S): at 18:16

## 2021-04-26 RX ADMIN — Medication 400 MILLIGRAM(S): at 09:15

## 2021-04-26 RX ADMIN — Medication 15 MILLIGRAM(S): at 23:59

## 2021-04-26 NOTE — ED BEHAVIORAL HEALTH NOTE - BEHAVIORAL HEALTH NOTE
Writer contacted and spoke with pt’s significant other, Cam Rebolledo, at [phone]. Significant other states pt lives alone but that she has been staying with him. Pt has 10 year old daughter who lives with her maternal aunt full time. Pt visits when he is allowed to. Significant other reported child to be in a safe environment with aunt.     Significant other (s/o) reports pt was allegedly feeling a bit suicidal and that girlfriend wanted pt to be further monitored. Significant other reports that pt has disclosed prior mental health hx of wanting to hit himself and banging his head in past when depressed. S/o reports last known episode to be around Dec. 2020. S/o reports that she had witnessed pt saying that everybody hates me, was in mental breakdown, and started hitting himself with his hand and hitting himself with objects (Dec. 2020). S/o had to further console pt with pt responding well. Pt can become angry, irritable and lash out at himself.  S/o reports now today about 2 hours ago they had an argument on phone to which pt said “this is why I am going to kill myself” and then hung up the phone. S/o reports that this was the first time she had heard pt report SI since incident in December, but is aware that pt mentioned thoughts about a month ago to hospital staff earlier this morning. Significant other does not feel pt would admit plan to harm self as he would not want to be in a psych. hospital. Significant other reports that if he is discharged from the hospital she would be willing to pick him up. No reports concern for substance use. Pt has no access to firearms. No known outpatient mental health treatment. No AH/VH or psychosis known. No reported hx of violence against others. No reported HI intent or plan. Pt is showering. Pt sleeping a lot more than usual.

## 2021-04-26 NOTE — CONSULT NOTE ADULT - SUBJECTIVE AND OBJECTIVE BOX
CC: sore throat     HPI: 35 year old male with no PMH that presents to [facility] stating he has had tonsillitis with worsening Sore throat for several days. States he was evaluated at [facility] in the ED two days ago and was sent home on percocet and Ibuprofen. Had fever of 102 last PM with chills. Denies any SOB, Dyspnea, cough, rigors, weakness. Denies covid exposure.       PAST MEDICAL & SURGICAL HISTORY:  No pertinent past medical history    No significant past surgical history      Allergies    morphine (Rash)    Intolerances      MEDICATIONS  (STANDING):  clindamycin IVPB      clindamycin IVPB 900 milliGRAM(s) IV Intermittent every 8 hours    MEDICATIONS  (PRN):    ROS:   ENT: all negative except as noted in HPI   CV: denies palpitations  Pulm: denies SOB, cough, hemoptysis  GI: denies change in apetite, indigestion, n/v  : denies pertinent urinary symptoms, urgency  Neuro: denies numbness/tingling, loss of sensation  Psych: denies anxiety  MS: denies muscle weakness, instability  Heme: denies easy bruising or bleeding  Endo: denies heat/cold intolerance, excessive sweating  Vascular: denies LE edema    Vital Signs Last 24 Hrs  T(C): 37.2 (26 Apr 2021 12:06), Max: 37.4 (26 Apr 2021 07:38)  T(F): 99 (26 Apr 2021 12:06), Max: 99.4 (26 Apr 2021 07:38)  HR: 70 (26 Apr 2021 12:06) (70 - 95)  BP: 144/89 (26 Apr 2021 12:06) (144/89 - 152/115)  BP(mean): --  RR: 18 (26 Apr 2021 12:06) (18 - 18)  SpO2: 99% (26 Apr 2021 12:06) (99% - 99%)                          16.1   9.18  )-----------( 235      ( 26 Apr 2021 09:23 )             49.1    04-26    138  |  101  |  17  ----------------------------<  102<H>  3.9   |  28  |  0.95    Ca    9.6      26 Apr 2021 09:23    TPro  8.0  /  Alb  4.4  /  TBili  0.6  /  DBili  x   /  AST  12  /  ALT  12  /  AlkPhos  73  04-26   PT/INR - ( 26 Apr 2021 09:23 )   PT: 13.4 sec;   INR: 1.18 ratio         PTT - ( 26 Apr 2021 09:23 )  PTT:32.1 sec    PHYSICAL EXAM:  Gen: NAD  Skin: No rashes, bruises, or lesions  Head: Normocephalic, Atraumatic  Face: no edema, erythema, or fluctuance. Parotid glands soft without mass  Eyes: no scleral injection  Ears: Right - ear canal clear, TM intact without effusion or erythema. No evidence of any fluid drainage. No mastoid tenderness, erythema, or ear bulging            Left - ear canal clear, TM intact without effusion or erythema. No evidence of any fluid drainage. No mastoid tenderness, erythema, or ear bulging  Nose: Nares bilaterally patent, no discharge  Mouth: No Stridor / Drooling / Trismus.  Mucosa moist, tongue/uvula midline, BL tonsil enlargement with mild exudate. Soft palate flat, mildly enlarged R>L   Neck: Flat, supple, no lymphadenopathy, trachea midline, no masses  Lymphatic: No lymphadenopathy  Resp: breathing easily, no stridor  CV: no peripheral edema/cyanosis  GI: nondistended   Peripheral vascular: no JVD or edema  Neuro: facial nerve intact, no facial droop      IMAGING/ADDITIONAL STUDIES:     CT:    IMPRESSION:    1. Bilateral palatine tonsillitis with right-sided peritonsillar phlegmon/early abscess formation.    2. Additional adenoiditis and lingual tonsillitis.    3. Cervical lymphadenopathy which is likely reactive.    4. Multiple carious teeth. Correlate with dental exam.    5. Sinusitis most notably affecting the left maxillary sinus. Odontogenic etiology is considered.

## 2021-04-26 NOTE — PROGRESS NOTE ADULT - ATTENDING COMMENTS
Please page ENT resident to reevaluate prior to any discharge planning  Pt also will need f/u with ENT for possible tonsillectomy consideration and evaluation as out patient

## 2021-04-26 NOTE — ED CDU PROVIDER INITIAL DAY NOTE - MEDICAL DECISION MAKING DETAILS
36 y/o M no PMHx here c/o throat pain x 1 week, worsening x 3 days, associated w/ hoarseness and worsening odynophagia. Had a fever of 103F overnight, prompting visit today. Was seen in the ED at onset of sx, testing negative for COVID, given Decadron and Motrin. Denies shortness of breath, cough or any other complaints. Took Motrin 600mg prior to arrival. In ED, CT demonstrated "Bilateral palatine tonsillitis with right-sided peritonsillar phlegmon/early abscess", patient received decadron and clindamycin IV. Patient referred to ED for ENT evaluation, IV abx, and monitoring.

## 2021-04-26 NOTE — ED ADULT NURSE NOTE - CAS EDN DISCHARGE ASSESSMENT
Discharged by KEY Baez MLP/Alert and oriented to person, place and time/Patient baseline mental status/Awake

## 2021-04-26 NOTE — ED CDU PROVIDER INITIAL DAY NOTE - PROGRESS NOTE DETAILS
Pt seen by ENT team, state no drainable collection at this time. Recommend liquid diet and abx. Will reassess pt this evening. Viral Kramer MD: received call from ENT team stating that they are not recommending drainage at this time, but do continue to recommend antibiotics. Plan to reassess after IV abx, po challenge This patient was signed out to me by damaris Greene CDU PA, at 1900 hrs.  In the interim, pt objectively noted to be resting comfortably, no issues thus far, tolerating PO Jello, applesauce, and liquids orally.  Test results discussed with pt; case d/w ENT who states as long as pt is tolerating PO, can be discharged from their perspective.  Pt is being observed overnight; CDU care plan discussed with pt who verbalizes agreement with plan.  On exam, pt stable, verbalizing in full, clear, effortless sentences.  Tonsils are mildly hyperemic and enlarged bilaterally, R>L; there are no exudates; uvula is midline, tongue appears WNL, neck supple, no palpable mass or crepitus.  Of note, pt's girlfriend contacted ED in the 9o'clock hour; stated that she was on the phone with the patient, they got into a verbal argument and pt stated he was "just going to go kill himself".  Pt's girlfriend stated he hung up on her, she tried calling him back, but he wasn't answering the phone, so she became concerned and called the ED.  I asked the patient about this interaction; pt states he made the statement out of anger and denies that he would hurt himself, has a plan to hurt himself, has ever tried to hurt himself in the past, or has any thought/plan to hurt anyone else.  I discussed this with ED attending Dr. Jin, who advised calling a psych consult; the covering psychiatry attending was notified of consult; ED ANM is aware of all.  Pt. stable at this time, pleasant, friendly, and cooperative with staff.  Will monitor pt closely for any change in status.  Psych consult pending.

## 2021-04-26 NOTE — ED PROVIDER NOTE - OBJECTIVE STATEMENT
34 y/o M no PMHx here c/o throat pain x 1 week, worsening x 3 days, associated w/ hoarseness and worsening odynophagia. Had a fever of 103F overnight, prompting visit today. Was seen in the ED at onset of sx, testing negative for COVID, given Decadron and Motrin. Denies shortness of breath, cough or any other complaints. Took Motrin 600mg prior to arrival.

## 2021-04-26 NOTE — ED CDU PROVIDER INITIAL DAY NOTE - PHYSICAL EXAMINATION
*GEN:   speaking in full sentences, no muffled voice, in no acute distress, AOx3  *EYES:   pupils equally round and reactive to light, extra-occular movements intact  *HEENT:   mild cervical LAD; bilat tonsillar swelling and irritaiton; airway patent  *CV:   regular rate and rhythm  *RESP:   clear to auscultation bilaterally, non-labored  *ABD:   soft, non-tender  *:   no cva/flank tenderness  *EXTREM:   no MSK tenderness, full ROM throughout, no leg swelling  *SKIN:   dry, intact  *NEURO:   AOx3, no focal weakness or loss of sensation

## 2021-04-26 NOTE — ED CDU PROVIDER INITIAL DAY NOTE - ATTENDING CONTRIBUTION TO CARE
Williams NIX: I agree with the above provided history and exam    I Viral Kramer MD performed a history and physical exam of the patient and discussed their management with the resident and /or advanced care provider. I reviewed the resident and /or ACP's note and agree with the documented findings and plan of care. My medical decision making and observations are found above.

## 2021-04-26 NOTE — ED PROVIDER NOTE - THROAT FINDINGS
R>L tonsillar edema, concerning for R PTA/no exudate/THROAT RED/UVULA DEVIATED LEFT/HOARSE/MUFFLED VOICE

## 2021-04-26 NOTE — ED CDU PROVIDER INITIAL DAY NOTE - OBJECTIVE STATEMENT
34 y/o M no PMHx here c/o throat pain x 1 week, worsening x 3 days, associated w/ hoarseness and worsening odynophagia. Had a fever of 103F overnight, prompting visit today. Was seen in the ED at onset of sx, testing negative for COVID, given Decadron and Motrin. Denies shortness of breath, cough or any other complaints. Took Motrin 600mg prior to arrival. In ED, CT demonstrated "Bilateral palatine tonsillitis with right-sided peritonsillar phlegmon/early abscess", patient received decadron and clindamycin IV. Patient referred to ED for ENT evaluation, IV abx, and monitoring.

## 2021-04-26 NOTE — CONSULT NOTE ADULT - PROBLEM SELECTOR RECOMMENDATION 9
1. Clindamycin    2. Decadron 8 mg IV  3. F/U CBC  4. Pain control  5. IVF, encourage PO fluids  6. No room is available to attempt drainage in ED at this time, unsure if theres a drainable collection.. ED will page ENT when room becomes available. Discussed case with ENT resident and Attending Dr. Fonseca

## 2021-04-26 NOTE — ED PROVIDER NOTE - CLINICAL SUMMARY MEDICAL DECISION MAKING FREE TEXT BOX
34 y/o M no PMHx here c/o throat pain x 1 week, worsening x 3 days, associated w/ hoarseness and worsening odynophagia. Concern for PTA. Plan labs, decadron, tylenol for pain (took Advil before arrival), CT neck, ent c/s

## 2021-04-26 NOTE — PROGRESS NOTE ADULT - SUBJECTIVE AND OBJECTIVE BOX
STATUS POST:  35y Male w R intratonsillar abscess. seen with attending.     Vital Signs Last 24 Hrs  T(C): 36.8 (26 Apr 2021 14:54), Max: 37.4 (26 Apr 2021 07:38)  T(F): 98.3 (26 Apr 2021 14:54), Max: 99.4 (26 Apr 2021 07:38)  HR: 66 (26 Apr 2021 14:54) (66 - 95)  BP: 141/99 (26 Apr 2021 14:54) (141/99 - 152/115)  BP(mean): --  RR: 15 (26 Apr 2021 14:54) (15 - 18)  SpO2: 97% (26 Apr 2021 14:54) (97% - 99%)    General: AAOx3, resting in bed, comfortable  Pulm: Nonlabored breathing, no respiratory distress  no sig trismus, no restricted ROM neck  no voice change or hot potato voice  R tonsil erythematous. soft palate no erythema or bulging    A/P: 34yo M w R tonsillar abscess  - IV abx clinda  - decadron prn  - encourage Po intake  - may dc if pt able to tolerate PO. dc on 10-14d clindamycin  - no f/up ENT needed unless worsening symptoms. Call [phone] for appointment.     ----------------------------------------------  Babak Schofield MD  Resident  Department of Otolaryngology - Head and Neck Surgery  Adult Page #[phone]  Peds Page #[phone] STATUS POST:  35y Male w R intratonsillar abscess. seen with attending.     Vital Signs Last 24 Hrs  T(C): 36.8 (26 Apr 2021 14:54), Max: 37.4 (26 Apr 2021 07:38)  T(F): 98.3 (26 Apr 2021 14:54), Max: 99.4 (26 Apr 2021 07:38)  HR: 66 (26 Apr 2021 14:54) (66 - 95)  BP: 141/99 (26 Apr 2021 14:54) (141/99 - 152/115)  BP(mean): --  RR: 15 (26 Apr 2021 14:54) (15 - 18)  SpO2: 97% (26 Apr 2021 14:54) (97% - 99%)    General: AAOx3, resting in bed, comfortable  Pulm: Nonlabored breathing, no respiratory distress  no sig trismus, no restricted ROM neck  no voice change or hot potato voice  R tonsil erythematous. soft palate no erythema or bulging    A/P: 36yo M w R early  tonsillar abscess  - IV abx clinda  - decadron prn  - clear liquid as tolerated  - Needs to receive IV antibiotic and reevaluated again for I & D reevaluation.     ----------------------------------------------  Babak Schofield MD  Resident  Department of Otolaryngology - Head and Neck Surgery  Adult Page #[phone]  Peds Page #[phone]

## 2021-04-26 NOTE — ED BEHAVIORAL HEALTH NOTE - BEHAVIORAL HEALTH NOTE
Writer outreached emergency contact listed as pt’s mother, Mariel Oden, at [phone]. Pt’s mother unaware of pt coming to ED reports pt to live on his own. Mother unaware of any other close contact of pt. Mother reports she last spoke with pt by phone about a week ago Pt at time had no reported medical complaints. No past mental health hx or concerns reported. Pt said to be working at a [employer] on [address]. No past psychiatric admissions reported. No known outpatient treatment. No known substance use. No known concerns for pt’s safety reported. No report of past psychiatric symptoms or SI/HI intent or plan.

## 2021-04-26 NOTE — ED ADULT TRIAGE NOTE - CHIEF COMPLAINT QUOTE
throat pain     c/o throat pain, fever since Tuesday. was seen 3 days ago for same.  was told to f/u with ent for possible removal of tonsils but unable to obtain appointment.  states pain worse. fever- tmax 103.  voice raspier than normal but speaking freely in full sentences. no drooling, stridor, sob.  states bp is always high when in hospital.  took  Motrin 600mg at 5am.

## 2021-04-27 VITALS
TEMPERATURE: 98 F | SYSTOLIC BLOOD PRESSURE: 143 MMHG | OXYGEN SATURATION: 99 % | HEART RATE: 62 BPM | RESPIRATION RATE: 15 BRPM | DIASTOLIC BLOOD PRESSURE: 94 MMHG

## 2021-04-27 DIAGNOSIS — F43.29 ADJUSTMENT DISORDER WITH OTHER SYMPTOMS: ICD-10-CM

## 2021-04-27 PROCEDURE — 90792 PSYCH DIAG EVAL W/MED SRVCS: CPT

## 2021-04-27 PROCEDURE — 99217: CPT

## 2021-04-27 RX ADMIN — Medication 15 MILLIGRAM(S): at 05:39

## 2021-04-27 RX ADMIN — Medication 100 MILLIGRAM(S): at 10:56

## 2021-04-27 RX ADMIN — Medication 900 MILLIGRAM(S): at 11:30

## 2021-04-27 RX ADMIN — Medication 15 MILLIGRAM(S): at 00:27

## 2021-04-27 RX ADMIN — Medication 15 MILLIGRAM(S): at 10:57

## 2021-04-27 RX ADMIN — Medication 15 MILLIGRAM(S): at 11:30

## 2021-04-27 RX ADMIN — Medication 102 MILLIGRAM(S): at 02:19

## 2021-04-27 RX ADMIN — Medication 100 MILLIGRAM(S): at 03:48

## 2021-04-27 NOTE — ED BEHAVIORAL HEALTH ASSESSMENT NOTE - NSACTIVEVENT_PSY_ALL_CORE
Triggering events leading to humiliation, shame, and/or despair (e.g., Loss of relationship, financial or health status) (real or anticipated)/Chronic pain or other acute medical condition

## 2021-04-27 NOTE — ED BEHAVIORAL HEALTH ASSESSMENT NOTE - SUMMARY
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 Patient reports smoking weed daily, drinks ETOH mostly on the weekends, denies any rehab or detox programs, denies tobacco use. Hx of arrest for "blasting music too loud", no current charges. Patient bib self for throat pain , found to have tonsillar abscess currently is in CDU for  medical observation . Psychiatry consult was requested after pt's girlfriend contacted Ed team informing that pt expressed si to her on the phone.  off note pt was seen by SBIRT earlier today and recommendations were placed.  Patient on assessment is fully engaged, well related, no acute mood sx are elicited or observed . Pt does not emet criteria for MDD. He made early statements of si , in the context of the argument with his girlfriend, denies any plan or intent , had fleeting passive si thoughts in recent weeks due to stress but again with no plan or intent. Pt has multiple protective factors including : daughter, employed, in the relationship, supportive family. No psychosis is present. Pt does not warrant involuntary admission , due to lack of acute concerns for pt's safety. Pt is cleared psychiatrically.

## 2021-04-27 NOTE — ED CDU PROVIDER SUBSEQUENT DAY NOTE - MEDICAL DECISION MAKING DETAILS
IV hydration, Decadron, clindamycin, supportive care, generalized observation care / monitoring; ENT is following pt.  Psychiatry cleared pt (see HPI above). Duong: IV hydration, Decadron, clindamycin, supportive care, generalized observation care / monitoring; ENT is following pt for tonsillar abscess/phlegmon.  Psychiatry cleared pt (see HPI above).

## 2021-04-27 NOTE — ED BEHAVIORAL HEALTH ASSESSMENT NOTE - HPI (INCLUDE ILLNESS QUALITY, SEVERITY, DURATION, TIMING, CONTEXT, MODIFYING FACTORS, ASSOCIATED SIGNS AND SYMPTOMS)
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 Patient reports smoking weed daily, drinks ETOH mostly on the weekends, denies any rehab or detox programs, denies tobacco use. Hx of arrest for "blasting music too loud", no current charges. Patient bib self for throat pain , found to have tonsillar abscess currently is in CDU for  medical observation . Psychiatry consult was requested after pt's girlfriend contacted Ed team informing that pt expressed si to her on the phone.  off note pt was seen by SBIRT earlier today and recommendations were placed.    On assessment pt is calm, cooperative well related. He reports he is in the hospital all day , feels frustrated , and was talking to his girlfriend, who he felt was not supportive by her  and "not being here for me ", they were arguing . During the argument he reports he admits making a statement "I better off not being around" and hung up the phone. Patient states he said it out of anger, and frustration  and had no actual thoughts of si/i/p. He denies any current passive or active si at this time.   Pt reports he has been under a lot fo stress due to work and has been working a lot and felt overwhelmed 2 weeks ago and had a fleeting thought of passive si "I don't want to be here, so I don't have responsibility anymore". He states the thought was fleeting and he had no plan or intent. He denies any persistent feeling of sadness , denies any disturbance in sleep, appetite , energy level other than the past couple of days because he has not felt well physically. Patient denies any feeling of hopelessness . No hi/i/p. No psychotic or manic sx . Pt is future oriented, reports he has his daughter and wants to be a good dad to her .   see  note for collateral.

## 2021-04-27 NOTE — ED BEHAVIORAL HEALTH ASSESSMENT NOTE - DESCRIPTION
calm, cooperative  Vital Signs Last 24 Hrs  T(C): 36.7 (26 Apr 2021 21:25), Max: 37.4 (26 Apr 2021 07:38)  T(F): 98.1 (26 Apr 2021 21:25), Max: 99.4 (26 Apr 2021 07:38)  HR: 68 (26 Apr 2021 21:25) (66 - 95)  BP: 121/90 (26 Apr 2021 21:25) (121/90 - 152/115)  BP(mean): --  RR: 16 (26 Apr 2021 21:25) (15 - 18)  SpO2: 98% (26 Apr 2021 21:25) (97% - 99%) employed, has 1 daughter, domicile alone. tonsillar abcess

## 2021-04-27 NOTE — CHART NOTE - NSCHARTNOTEFT_GEN_A_CORE
ENT brief note:  Patient seen and evaluated at bedside. Subjective improvement in symptoms. Tolerating PO.  Intraoral exam with L medial tonsil deviation but no erythema. Airway patent.   -PO Clindamycin; complete 10 day total course of Abx  - f/u with ENT for possible tonsillectomy consideration and evaluation as outpatient after treatment course  -OK for discharge from ENT perspective

## 2021-04-27 NOTE — ED CDU PROVIDER SUBSEQUENT DAY NOTE - PHYSICAL EXAMINATION
CONSTITUTIONAL:  Well appearing, awake, alert, oriented to person, place, time/situation and in no apparent distress.  Pt. is objectively comfortable appearing and verbalizing in full, clear, effortless sentences.  ENMT: NC/AT.  Airway patent.  Nasal mucosa clear.  Moist mucous membranes.  Tonsils mildly hyperemic, enlarged, R>L, uvula midline.  No tonsillar exudates noted.  Neck supple.  No cervical masses or crepitus noted.  EYES:  Clear OU.  CARDIAC:  Normal rate, regular rhythm.  Heart sounds S1 S2.  No murmurs, gallops, or rubs.  RESPIRATORY:  Breath sounds clear and equal bilaterally.  No wheezes, no rales, no rhonchi.  GASTROINTESTINAL:  Abdomen soft, non-distended, non-tender.  No rebound, no guarding.  MUSCULOSKELETAL:  Range of motion is not limited.  Gait stable.    SKIN:  Skin color unremarkable.  Skin warm, dry, and intact.    PSYCHIATRIC:  Alert and oriented to person/place/time/situation.  Mood and affect WNL.  No apparent risk to self or others.

## 2021-04-27 NOTE — ED CDU PROVIDER DISPOSITION NOTE - NSFOLLOWUPINSTRUCTIONS_ED_ALL_ED_FT
Rest, drink plenty of fluids. Follow up with an ENT in 1-2 weeks. Antibiotics were sent to your pharmacy (clindamycin) 3 times per day. Prednisone daily and take Naproxen every 12 hours as needed for pain. Advance activity as tolerated.  Continue all previously prescribed medications as directed.  Follow up with your primary care physician in 48-72 hours- bring copies of your results.  Return to the ER for worsening or persistent symptoms, and/or ANY NEW OR CONCERNING SYMPTOMS. THIS INCLUDES BUT IS NOT LIMITED TO INABILITY TO SWALLOW, SPEAK, SHORTNESS OF BREATH OR FOR ANY OTHER SYMPTOMS THAT CONCERN YOU. If you have issues obtaining follow up, please call: [phone] to obtain a doctor or specialist who takes your insurance in your area.  You may call [phone] to make an appointment with the internal medicine clinic.

## 2021-04-27 NOTE — ED CDU PROVIDER DISPOSITION NOTE - CLINICAL COURSE
34 yo male, no stated PMH, SH: daily marijuana smoker and drinks alcohol on weekends; pt presented to the ED here c/o throat pain x 1 week, worse x 3 days, a/w hoarseness, odynophagia, and fever to Tmax 103 F.  Pt was seen in the ED 4/22 for symptoms, tested negative for COVID, was given Decadron and Motrin. Pt endorsed to his partner after an argument that he would kill himself, pt recanted when questioned states he was stressed. Pt was also seen and cleared by Psychiatry. Pt was reassessed by ENT who recommends D/C with outpatient F/U for possible tonsillectomy. Pt has been tolerating PO, well appearing. Rhoda: 36 yo male, no stated PMH, SH: daily marijuana smoker and drinks alcohol on weekends; pt presented to the ED here c/o throat pain x 1 week, worse x 3 days, a/w hoarseness, odynophagia, and fever to Tmax 103 F.  Pt was seen in the ED 4/22 for symptoms, tested negative for COVID, was given Decadron and Motrin. Pt endorsed to his partner after an argument that he would kill himself, pt recanted when questioned states he was stressed. Pt was also seen and cleared by Psychiatry. Pt was reassessed by ENT who recommends D/C with outpatient F/U for possible tonsillectomy. Pt has been tolerating PO, well appearing.

## 2021-04-27 NOTE — ED ADULT NURSE REASSESSMENT NOTE - NS ED NURSE REASSESS COMMENT FT1
Patient verbalizes understanding of discharge instructions and follow up care, information given by KEY BARNETT. IV discontinued by provider, bleeding stopped and bandage applied. Patient ambulates steady gait, breathing even unlabored. Patient exited ED to waiting area, safety maintained.

## 2021-04-27 NOTE — ED CDU PROVIDER DISPOSITION NOTE - ATTENDING CONTRIBUTION TO CARE
I have seen and evaluated the patient face to face, endorse the HPI, PE, as edited and/or reviewed by me as needed and have indicated my contribution to care in the MDM and/or Clinical Course section.

## 2021-04-27 NOTE — ED BEHAVIORAL HEALTH ASSESSMENT NOTE - SAFETY PLAN ADDT'L DETAILS
Safety plan discussed with.../Education provided regarding environmental safety / lethal means restriction/Provision of National Suicide Prevention Lifeline 5-529-933-DLGA ([phone])

## 2021-04-27 NOTE — ED BEHAVIORAL HEALTH ASSESSMENT NOTE - RISK ASSESSMENT
low acute risk no active or passive si at this time, laurent snot meet criteria for MDD.   risk factors: medical issue, substance use   protective factors: supportive family, dependents, employed, no previous sa. The protective factors outweigh risk factors and pt does not warrant admission. Low Acute Suicide Risk

## 2021-04-27 NOTE — ED BEHAVIORAL HEALTH ASSESSMENT NOTE - DETAILS
10 y/o daughter NP is notified fleeting passive si , no hx of sa advised pt to return to ed or call 911 if sx worsen or si/hi is present throat pain pt grew up in the foster home

## 2021-04-27 NOTE — ED CDU PROVIDER SUBSEQUENT DAY NOTE - HISTORY
36 yo male, no stated PMH, SH: daily marijuana smoker and drinks alcohol on weekends; pt presented to the ED here c/o throat pain x 1 week, worse x 3 days, a/w hoarseness, odynophagia, and fever to Tmax 103 F.  Pt was seen in the ED 4/22 for symptoms, tested negative for COVID, was given Decadron and Motrin.   On the current ED visit, CT demonstrated "....Bilateral palatine tonsillitis with right-sided peritonsillar phlegmon/early abscess formation....", patient received decadron and clindamycin IV; ENT was consulted; no I&D felt indicated at this time.  Pt. was dispo'd to CDU for continued treatment:  IV hydration, Decadron, clindamycin, supportive care, generalized observation care / monitoring; ENT is following pt.    In the interim, pt states he is feeling much improved; states he was able to tolerate PO liquids, jello, and applesauce without difficulty.  Of note, on 4/26 evening, pt's girlfriend contacted ED, stated that she was on the phone with the patient, they got into a verbal argument and pt stated he was "just going to go kill himself".  Pt's girlfriend stated he hung up on her, she tried calling him back, but he wasn't answering the phone, so she became concerned and called the ED.  I asked the patient about this interaction; pt states he made the statement out of anger and denies that he would hurt himself, has a plan to hurt himself, has ever tried to hurt himself in the past, or has any thought/plan to hurt anyone else.  Psych consult was called; psychiatry evaluated and cleared pt.  No other statements noted in the interim, pt has been objectively noted to be resting comfortably.  No other issues in the interim thus far.

## 2021-04-27 NOTE — ED CDU PROVIDER DISPOSITION NOTE - PATIENT PORTAL LINK FT
You can access the FollowMyHealth Patient Portal offered by [facility] by registering at the following website: [email or web address]. By joining [facility]’s FollowMyHealth portal, you will also be able to view your health information using other applications (apps) compatible with our system.

## 2021-04-27 NOTE — ED ADULT NURSE REASSESSMENT NOTE - NS ED NURSE REASSESS COMMENT FT1
ENT service at bedside evaluating pt. pt tolerated well, breathing even, unlabored. denies dysphagia or difficulty swallowing

## 2021-04-27 NOTE — ED BEHAVIORAL HEALTH ASSESSMENT NOTE - NSSUICPROTFACT_PSY_ALL_CORE
Responsibility to children, family, or others/Identifies reasons for living/Supportive social network of family or friends/Cultural, spiritual and/or moral attitudes against suicide/Engaged in work or school

## 2021-04-28 PROBLEM — Z00.00 ENCOUNTER FOR PREVENTIVE HEALTH EXAMINATION: Status: ACTIVE | Noted: 2021-04-28

## 2021-05-10 ENCOUNTER — APPOINTMENT (OUTPATIENT)
Dept: OTOLARYNGOLOGY | Facility: CLINIC | Age: 36
End: 2021-05-10

## 2021-05-12 ENCOUNTER — TRANSCRIPTION ENCOUNTER (OUTPATIENT)
Age: 36
End: 2021-05-12

## 2021-05-17 ENCOUNTER — APPOINTMENT (OUTPATIENT)
Dept: OTOLARYNGOLOGY | Facility: CLINIC | Age: 36
End: 2021-05-17
Payer: MEDICAID

## 2021-05-17 VITALS
HEART RATE: 74 BPM | BODY MASS INDEX: 26.53 KG/M2 | HEIGHT: 67 IN | DIASTOLIC BLOOD PRESSURE: 90 MMHG | SYSTOLIC BLOOD PRESSURE: 134 MMHG | TEMPERATURE: 97.6 F | WEIGHT: 169 LBS | OXYGEN SATURATION: 98 %

## 2021-05-17 DIAGNOSIS — R49.0 DYSPHONIA: ICD-10-CM

## 2021-05-17 PROCEDURE — 99214 OFFICE O/P EST MOD 30 MIN: CPT | Mod: 25

## 2021-05-17 PROCEDURE — 99072 ADDL SUPL MATRL&STAF TM PHE: CPT

## 2021-05-17 PROCEDURE — 31575 DIAGNOSTIC LARYNGOSCOPY: CPT

## 2021-05-17 NOTE — ASSESSMENT
[FreeTextEntry1] : Tonsil/adenoid hypertrophy.  S/p hospitalization for tonsillitis.  Now with Raspy voice\par - Recommend tonsillectomy/adenoidectomy for obstructive tonsils with discomfort. \par - discussed r/b/a of tonsillectomy and patient or parents would like to proceed\par - specifically discussed risk of bleeding/infection/injury to lips-teeth-gums/change in sound of voice/VPI-nasal regurgitation with drinking liquids/risks associated with general anesthesia\par - discussed expected recovery and post-operative pain management\par - all questions answered and they would like to proceed so will schedule\par - Reflux precautions to reduce any VC irritation.  Handout given.  \par \par \par HTN\par - F/U with PMD\par \par \par I personally saw and examined Mr. CHRISTOPHER HARKINS in detail this visit today. I personally reviewed the HPI, PMH, FH, SH, ROS and medications/allergies. I have spoken to MERA Recinos regarding the history and have personally determined the assessment and plan of care, and documented this myself. I was present and participated in all key portions of the encounter and all procedures noted above. I have made changes in the body of the note where appropriate.\par \par Attesting Faculty: See Attending Signature Below

## 2021-05-17 NOTE — HISTORY OF PRESENT ILLNESS
[de-identified] : 36 y/o M s/p Hospitalization at [facility] 4/26/21 with [place], Lingual tonsillitis and adenoiditis.  Notes now throat is feeling better but he is now losing his voice.  Now with no trouble eating or drinking.  No SOB. He works as an [employer] and has been having difficulty projecting his voice and feels it is muffled and changed. Making it difficult for him to speak and has to exert more effort. Does not think he snores but is unsure.\par No prior hx of recurrent infections. \par Hx of Smoking Marijuana, Quit 21 days ago.  No hx of Tobacco.

## 2021-05-17 NOTE — PROCEDURE
[de-identified] : Flexible scope #2 used. Passed through nasal passage and nasopharynx/hypopharynx clear.  Oropharynx nearly completely obstructed by Tonsil hypertrophy ~5mm opening posteriorly.  Supraglottis normal. Glottis with fully mobile vocal cords without lesions or masses. Visualized subglottis clear. Postcricoid area without erythema or edema. No pooling of secretions. 50% adenoid pad.  \par \par

## 2021-05-17 NOTE — CONSULT LETTER
[Dear  ___] : Dear  [unfilled], [Consult Letter:] : I had the pleasure of evaluating your patient, [unfilled]. [Please see my note below.] : Please see my note below. [Consult Closing:] : Thank you very much for allowing me to participate in the care of this patient.  If you have any questions, please do not hesitate to contact me. [Sincerely,] : Sincerely, [FreeTextEntry2] : Coty Berkowitz NP\par [facility] \par [phone] [FreeTextEntry3] : Sheree Maradiaga MD\par Otolaryngology and Cranial Base Surgery\par Attending Physician - Department of Otolaryngology and Head & Neck Surgery\par [facility]\par  - [employer] [facility]\par Office: [phone]\par Fax: [phone]\par

## 2021-06-15 ENCOUNTER — OUTPATIENT (OUTPATIENT)
Dept: OUTPATIENT SERVICES | Facility: HOSPITAL | Age: 36
LOS: 1 days | End: 2021-06-15
Payer: MEDICAID

## 2021-06-15 VITALS
HEIGHT: 67 IN | RESPIRATION RATE: 18 BRPM | TEMPERATURE: 98 F | DIASTOLIC BLOOD PRESSURE: 92 MMHG | WEIGHT: 175.05 LBS | HEART RATE: 72 BPM | OXYGEN SATURATION: 96 % | SYSTOLIC BLOOD PRESSURE: 153 MMHG

## 2021-06-15 DIAGNOSIS — Z86.14 PERSONAL HISTORY OF METHICILLIN RESISTANT STAPHYLOCOCCUS AUREUS INFECTION: Chronic | ICD-10-CM

## 2021-06-15 DIAGNOSIS — J35.1 HYPERTROPHY OF TONSILS: ICD-10-CM

## 2021-06-15 DIAGNOSIS — J35.2 HYPERTROPHY OF ADENOIDS: ICD-10-CM

## 2021-06-15 DIAGNOSIS — Z98.890 OTHER SPECIFIED POSTPROCEDURAL STATES: Chronic | ICD-10-CM

## 2021-06-15 DIAGNOSIS — R49.0 DYSPHONIA: ICD-10-CM

## 2021-06-15 DIAGNOSIS — Z01.818 ENCOUNTER FOR OTHER PREPROCEDURAL EXAMINATION: ICD-10-CM

## 2021-06-15 LAB
ANION GAP SERPL CALC-SCNC: 14 MMOL/L — SIGNIFICANT CHANGE UP (ref 5–17)
BUN SERPL-MCNC: 21 MG/DL — SIGNIFICANT CHANGE UP (ref 7–23)
CALCIUM SERPL-MCNC: 9.3 MG/DL — SIGNIFICANT CHANGE UP (ref 8.4–10.5)
CHLORIDE SERPL-SCNC: 104 MMOL/L — SIGNIFICANT CHANGE UP (ref 96–108)
CO2 SERPL-SCNC: 20 MMOL/L — LOW (ref 22–31)
CREAT SERPL-MCNC: 0.97 MG/DL — SIGNIFICANT CHANGE UP (ref 0.5–1.3)
GLUCOSE SERPL-MCNC: 85 MG/DL — SIGNIFICANT CHANGE UP (ref 70–99)
HCT VFR BLD CALC: 45.1 % — SIGNIFICANT CHANGE UP (ref 39–50)
HGB BLD-MCNC: 14.6 G/DL — SIGNIFICANT CHANGE UP (ref 13–17)
MCHC RBC-ENTMCNC: 26.8 PG — LOW (ref 27–34)
MCHC RBC-ENTMCNC: 32.4 GM/DL — SIGNIFICANT CHANGE UP (ref 32–36)
MCV RBC AUTO: 82.9 FL — SIGNIFICANT CHANGE UP (ref 80–100)
NRBC # BLD: 0 /100 WBCS — SIGNIFICANT CHANGE UP (ref 0–0)
PLATELET # BLD AUTO: 230 K/UL — SIGNIFICANT CHANGE UP (ref 150–400)
POTASSIUM SERPL-MCNC: 4 MMOL/L — SIGNIFICANT CHANGE UP (ref 3.5–5.3)
POTASSIUM SERPL-SCNC: 4 MMOL/L — SIGNIFICANT CHANGE UP (ref 3.5–5.3)
RBC # BLD: 5.44 M/UL — SIGNIFICANT CHANGE UP (ref 4.2–5.8)
RBC # FLD: 14.1 % — SIGNIFICANT CHANGE UP (ref 10.3–14.5)
SODIUM SERPL-SCNC: 138 MMOL/L — SIGNIFICANT CHANGE UP (ref 135–145)
WBC # BLD: 5.03 K/UL — SIGNIFICANT CHANGE UP (ref 3.8–10.5)
WBC # FLD AUTO: 5.03 K/UL — SIGNIFICANT CHANGE UP (ref 3.8–10.5)

## 2021-06-15 PROCEDURE — 85027 COMPLETE CBC AUTOMATED: CPT

## 2021-06-15 PROCEDURE — G0463: CPT

## 2021-06-15 PROCEDURE — 80048 BASIC METABOLIC PNL TOTAL CA: CPT

## 2021-06-15 RX ORDER — LIDOCAINE HCL 20 MG/ML
0.2 VIAL (ML) INJECTION ONCE
Refills: 0 | Status: DISCONTINUED | OUTPATIENT
Start: 2021-06-22 | End: 2021-07-06

## 2021-06-15 RX ORDER — SODIUM CHLORIDE 9 MG/ML
3 INJECTION INTRAMUSCULAR; INTRAVENOUS; SUBCUTANEOUS EVERY 8 HOURS
Refills: 0 | Status: DISCONTINUED | OUTPATIENT
Start: 2021-06-22 | End: 2021-07-06

## 2021-06-15 NOTE — H&P PST ADULT - NSICDXPROBLEM_GEN_ALL_CORE_FT
PROBLEM DIAGNOSES  Problem: Hypertrophy of tonsils  Assessment and Plan: Dysphonia  Hypertrophy of the Adenoids  -cbc, bmp done at PST  -preop instructions

## 2021-06-15 NOTE — H&P PST ADULT - NEGATIVE ENMT SYMPTOMS
no sinus symptoms/no nasal congestion/no nasal discharge/no nasal obstruction/no nose bleeds/no recurrent cold sores/no throat pain/no dysphagia

## 2021-06-15 NOTE — H&P PST ADULT - HISTORY OF PRESENT ILLNESS
35 year old male with no significant PMH s/p hospitalization at [facility] in April 2021 with Tonsilitis and Adenoiditis. He complains of some hoarseness, and has been having difficulty projecting his voice. He denies SOB, fever, chills,  dysphagia to solids or liquids,  He presents to [facility] for evaluation prior to scheduled Tonsillectomy and Adenoidectomy on 6/22/2021    preop covid screen 6/19 at [facility]

## 2021-06-15 NOTE — H&P PST ADULT - NSANTHOSAYNRD_GEN_A_CORE
No. STELLA screening performed.  STOP BANG Legend: 0-2 = LOW Risk; 3-4 = INTERMEDIATE Risk; 5-8 = HIGH Risk

## 2021-06-15 NOTE — H&P PST ADULT - MUSCULOSKELETAL
detailed exam negative ROM intact/no joint swelling/no joint erythema/no joint warmth/no calf tenderness

## 2021-06-15 NOTE — H&P PST ADULT - DOES PATIENT HAVE ADVANCE DIRECTIVE
Palliative Care Consult Note  Patient: Jacqueline Merritt  Gender: female  YOB: 1949  Unit/Bed: R458/K493-51  Code Status: Full Code  Inpatient Treatment Team: Treatment Team: Attending Provider: Mindi Pena MD; Consulting Physician: Clarke Edwards MD; Consulting Physician: Jose Alejandro Deng MD; Consulting Physician: Mukul Cason MD; Utilization Reviewer: Heidi Chowdhury RN; Consulting Physician: Jeff Rosario MD; Surgeon: Mukul Cason MD; Utilization Reviewer: Viri Wang RN; Surgeon: Jeff Rosario MD; Registered Nurse: Colonel Rader, MANISHA; : CASS Lopez; Patient Care Tech: Graciela Ramona; : Andres Peralta RN  Admit Date:  2/1/2021    Chief Complaint: Goals of Care    History of Presenting Illness:      Jacqueline Merritt is a 70 y.o. female on hospital day 7 with a history of Anxiety and panic attacks, she had been experiencing abdominal pain, constipation, anorexia, and weight loss for several weeks and was sent to ED by PCP for dehydration. Abdominal CT showed: Thickening and nodularity of the omentum concerning for peritoneal metastasis or peritonitis. A large amount of ascites is present.       Two ill-defined lesions within the right lobe of the liver measuring 1 cm and 2 cm respectively are nonspecific but most concerning for metastatic lesions.       Mild perivesicular inflammation.  Correlation with urinalysis recommended to exclude cystitis.       Large amount stool within the rectosigmoid colon may represent constipation and/or fecal impaction.       Subtle nodularity of the liver suggests cirrhosis.       Mild right-sided hydronephrosis without radiopaque calculus.       Debris within the gallbladder likely represents gallstones and/or gallbladder sludge.       Small right pleural effusion.         Cytology from 2/2/21 showed Atypical mesothelial cells, favor reactive, macrophages, RBCs and WBCs present.  Elevated CA-125 She developed a bowel obstruction at the level of distal colon most likely from mets, NG tube in place, she is not in any pain, no nausea, but abdomen is quite distended and firm. She is having surgery today for a diverting colostomy and placement of G tube for nutrition. She will be evaluated for possible chemotx after recovery depend ending on performance status per oncology. She admits to increased anxiety,  at bedside. NO chest pain, edema, or SOB. Again, no pain or nausea at this time. Review of Systems:       Review of Systems   Constitutional: Negative for activity change, appetite change, chills, diaphoresis, fatigue, fever and unexpected weight change. HENT: Negative for drooling, hearing loss, mouth sores, sore throat, trouble swallowing and voice change. Eyes: Negative for discharge and visual disturbance. Respiratory: Negative for apnea, cough, choking, chest tightness, shortness of breath, wheezing and stridor. Cardiovascular: Negative for chest pain, palpitations and leg swelling. Gastrointestinal: Positive for abdominal distention and constipation. Negative for abdominal pain, anal bleeding, blood in stool, diarrhea, nausea, rectal pain and vomiting. Genitourinary: Negative for difficulty urinating, dysuria, enuresis, frequency and hematuria. Musculoskeletal: Negative for arthralgias, back pain, gait problem, joint swelling and myalgias. Skin: Positive for pallor. Negative for color change, rash and wound. Allergic/Immunologic: Negative for food allergies and immunocompromised state. Neurological: Negative for dizziness, tremors, seizures, syncope, facial asymmetry, speech difficulty, weakness, light-headedness, numbness and headaches. Hematological: Negative for adenopathy. Does not bruise/bleed easily. Mental Status: She is alert and oriented to person, place, and time. Psychiatric:         Behavior: Behavior normal.         Thought Content: Thought content normal.         Allergies:       Allergies   Allergen Reactions    Aspirin Other (See Comments)     GI issue       Medications:      Current Facility-Administered Medications   Medication Dose Route Frequency Provider Last Rate Last Admin    ciprofloxacin (CIPRO) IVPB 400 mg  400 mg Intravenous Once Terrance Rojo MD        metronidazole (FLAGYL) 500 mg in NaCl 100 mL IVPB premix  500 mg Intravenous Once Terrance Rojo MD        0.9 % sodium chloride infusion   Intravenous Continuous Johanna Arteaga  mL/hr at 02/08/21 1103 1,000 mL at 02/08/21 1103    glucose (GLUTOSE) 40 % oral gel 15 g  15 g Oral PRN Cecilio Davey MD        dextrose 50 % IV solution  12.5 g Intravenous PRN Cecilio Davey MD        glucagon (rDNA) injection 1 mg  1 mg Intramuscular PRN Cecilio Davey MD        dextrose 5 % solution  100 mL/hr Intravenous PRN Cecilio Davey MD        insulin glargine (LANTUS) injection vial 10 Units  10 Units Subcutaneous Nightly Cecilio Davey MD        insulin lispro (HUMALOG) injection vial 0-6 Units  0-6 Units Subcutaneous Q6H Cecilio Davey MD   1 Units at 02/07/21 1834    potassium chloride 10 mEq/100 mL IVPB (Peripheral Line)  10 mEq Intravenous PRN Cecilio Davey MD 80 mL/hr at 02/08/21 1009 10 mEq at 02/08/21 1009    magnesium sulfate 1000 mg in dextrose 5% 100 mL IVPB  1,000 mg Intravenous PRN Cecilio Davey MD        sodium phosphate 10.89 mmol in dextrose 5 % 250 mL IVPB  0.16 mmol/kg Intravenous PRN Cecilio Davey MD        Or    sodium phosphate 21.75 mmol in dextrose 5 % 250 mL IVPB  0.32 mmol/kg Intravenous PRN Cecilio Davey MD        benzocaine (HURRICAINE) 20 % oral spray   Mouth/Throat 4x Daily PRN Cecilio Davey MD   Given at 02/07/21 1205  PN-Adult Premix 4.25/10 - Peripheral Line   Intravenous Continuous TPN Sweta Trujillo MD 75 mL/hr at 02/07/21 2012 New Bag at 02/07/21 2012    ketorolac (TORADOL) injection 15 mg  15 mg Intravenous Q6H PRN Sweta Trujillo MD   15 mg at 02/08/21 1105    famotidine (PEPCID) injection 20 mg  20 mg Intravenous BID Sweta Trujillo MD   20 mg at 02/08/21 0910    pantoprazole (PROTONIX) tablet 40 mg  40 mg Oral QAM AC Ольга Joel MD        sodium chloride flush 0.9 % injection 10 mL  10 mL Intravenous 2 times per day Ольга Joel MD   10 mL at 02/07/21 2011    sodium chloride flush 0.9 % injection 10 mL  10 mL Intravenous PRN Ольга Joel MD        sodium chloride flush 0.9 % injection 10 mL  10 mL Intravenous 2 times per day Ольга Joel MD   10 mL at 02/08/21 0856    sodium chloride flush 0.9 % injection 10 mL  10 mL Intravenous PRN Ольга Joel MD   10 mL at 02/06/21 2017    spironolactone (ALDACTONE) tablet 50 mg  50 mg Oral Daily Sweta Trujillo MD        furosemide (LASIX) tablet 20 mg  20 mg Oral Daily Sweta Trujillo MD        sodium chloride flush 0.9 % injection 10 mL  10 mL Intravenous Once Angela O Evanman, DO        sodium chloride flush 0.9 % injection 10 mL  10 mL Intravenous 2 times per day Sweta Trujillo MD   10 mL at 02/06/21 0930    sodium chloride flush 0.9 % injection 10 mL  10 mL Intravenous PRN Sweta Trujillo MD        enoxaparin (LOVENOX) injection 40 mg  40 mg Subcutaneous Daily Sweta Trujillo MD   Stopped at 02/08/21 0900    promethazine (PHENERGAN) tablet 12.5 mg  12.5 mg Oral Q6H PRN Sweta Trujillo MD        Or    ondansetron (ZOFRAN) injection 4 mg  4 mg Intravenous Q6H PRN Sweta Trujillo MD   4 mg at 02/08/21 1105    acetaminophen (TYLENOL) tablet 650 mg  650 mg Oral Q6H PRN Sweta Trujillo MD   650 mg at 02/06/21 0940    Or    acetaminophen (TYLENOL) suppository 650 mg  650 mg Rectal Q6H PRN Sweta Trujillo MD  None   Social History Narrative    Retired- worked with  in law [employer]     3 children. [place] in [place] Others in [facility] up in [address].         Family Hx:  Family History   Problem Relation Age of Onset    Breast Cancer Mother     Heart Disease Father     Atrial Fibrillation Brother        LABS: Reviewed     CBC:  Lab Results   Component Value Date    WBC 15.1 02/08/2021    RBC 4.72 02/08/2021    HGB 13.9 02/08/2021    HCT 41.8 02/08/2021    MCV 88.6 02/08/2021    MCH 29.5 02/08/2021    MCHC 33.3 02/08/2021    RDW 13.6 02/08/2021     02/08/2021     CBC with Differential:   Lab Results   Component Value Date    WBC 15.1 02/08/2021    RBC 4.72 02/08/2021    HGB 13.9 02/08/2021    HCT 41.8 02/08/2021     02/08/2021    MCV 88.6 02/08/2021    MCH 29.5 02/08/2021    MCHC 33.3 02/08/2021    RDW 13.6 02/08/2021    METASPCT 1 02/01/2021    LYMPHOPCT 10.0 02/08/2021    MONOPCT 3.8 02/08/2021    BASOPCT 0.2 02/08/2021    MONOSABS 0.6 02/08/2021    LYMPHSABS 1.7 02/08/2021    EOSABS 0.0 02/08/2021    BASOSABS 0.0 02/08/2021     CMP:    Lab Results   Component Value Date     02/08/2021    K 3.4 02/08/2021    CL 96 02/08/2021    CO2 18 02/08/2021    BUN 63 02/08/2021    CREATININE 1.85 02/08/2021    GFRAA 32.4 02/08/2021    LABGLOM 26.8 02/08/2021    GLUCOSE 233 02/08/2021    PROT 5.9 02/02/2021    LABALBU 3.1 02/02/2021    CALCIUM 8.3 02/08/2021    BILITOT 0.6 02/02/2021    ALKPHOS 139 02/02/2021    AST 32 02/02/2021    ALT 38 02/02/2021     BMP:    Lab Results   Component Value Date     02/08/2021    K 3.4 02/08/2021    CL 96 02/08/2021    CO2 18 02/08/2021    BUN 63 02/08/2021    LABALBU 3.1 02/02/2021    CREATININE 1.85 02/08/2021    CALCIUM 8.3 02/08/2021    GFRAA 32.4 02/08/2021    LABGLOM 26.8 02/08/2021    GLUCOSE 233 02/08/2021     TSH: No results found for: TSH  Vitamin B12 and Folate: No components found for: FOLIC,  Q14  Urinalysis:   Lab Results Component Value Date    NITRU Negative 02/01/2021    BLOODU Negative 02/01/2021    SPECGRAV 1.065 02/01/2021    GLUCOSEU Negative 02/01/2021           FUNCTIONAL ADL´S:     Independent: [ x ] Eating, [   ] Dressing, [   ] Transferring, [   ] Cookie Ore, [   ] Riki Ponto, [   ] Continence  Dependent   : [  ] Eating, [   ] Dressing, [   ] Transferring, [   ] Cookie Ore, [   ] Riki Ponto, [   ] Continence  W. assistant : [  ] Eating, [   x] Dressing, [  x ] Transferring, [x   ] Toileting, [ x  ] Bathing, [  x ] Continence    Radiology: Reviewed      No results found. Assessment and plan: Adenocarcinoma with malignant ascites; possible abdominal carcinomatosis  - Oncology following   - s/p paracentesis with 4L removed. Possibly pancreatic vs cholangiocarcinoma based on CA 19-9    High grade obstruction of descending colon  - Colorectal surgery following  - Colectomy and G tube scheduled for today      Anorexia/ Unintentional weight loss  - G tube will be placed today  - Consider restarting mirtazapine after surgery  - Nutritional consult will be ordered    Nausea  - NG tube in place  - Colectomy today  - Continue Ondansetron 4 mg IV every 4 hours prn nausea    Anxiety  - Consider restarting Mirtazapine 7.5 mg po at HS after surgery      Advance Care Planning  Discussed goals of care with patient. Explained in extensive detail nuances between full code, DNR CCA and DNR CC. Patient has made the decision to be FULL CODE  NO MPOA or Living Will in place. Bill Chua and her  would like to focus on getting through this hospitalization and will then work on this. -Goals of Care Discussion:  Disease process and goals of treatment were discussed in basic terms. Rubina's goal is to optimize available comfort care measures to decrease hospitalizations and maximize function. She hopes to gain enough strength to pursue an aggressive cancer treatment regimen. We discussed the palliative care philosophy in light of those goals. We discussed all care options contingent on treatment response and QOL. Much active listening, presence, and emotional support were given. Due to advanced cancer diagnosis and high symptom burden, Yadira Hernandez may benefit form palliative care upon discharge. Thank you for allowing me to participate in Rubina's care.       Electronically signed by ALBERTINA Vasquez CNP on 2/8/2021 at 11:15 AM No

## 2021-06-15 NOTE — H&P PST ADULT - NSICDXPASTMEDICALHX_GEN_ALL_CORE_FT
PAST MEDICAL HISTORY:  H/O tonsillitis and Adenoiditis  hospitalized at [facility]  for 2 days April 2021    Marijuana smoker sober for 51 days    MRSA infection right upper extremity

## 2021-06-15 NOTE — H&P PST ADULT - NSICDXPASTSURGICALHX_GEN_ALL_CORE_FT
PAST SURGICAL HISTORY:  History of incision and drainage right upper extremity secondary to mrsa infection approx 6 years ago

## 2021-06-16 PROBLEM — F12.90 CANNABIS USE, UNSPECIFIED, UNCOMPLICATED: Chronic | Status: ACTIVE | Noted: 2021-04-27

## 2021-06-16 PROBLEM — Z87.09 PERSONAL HISTORY OF OTHER DISEASES OF THE RESPIRATORY SYSTEM: Chronic | Status: ACTIVE | Noted: 2021-06-15

## 2021-06-16 PROBLEM — A49.02 METHICILLIN RESISTANT STAPHYLOCOCCUS AUREUS INFECTION, UNSPECIFIED SITE: Chronic | Status: ACTIVE | Noted: 2021-06-15

## 2021-06-17 ENCOUNTER — NON-APPOINTMENT (OUTPATIENT)
Age: 36
End: 2021-06-17

## 2021-06-17 RX ORDER — IBUPROFEN 600 MG/1
600 TABLET, FILM COATED ORAL EVERY 6 HOURS
Qty: 40 | Refills: 2 | Status: ACTIVE | COMMUNITY
Start: 2021-06-17 | End: 1900-01-01

## 2021-06-17 RX ORDER — OXYCODONE 5 MG/1
5 TABLET ORAL EVERY 6 HOURS
Qty: 28 | Refills: 0 | Status: ACTIVE | COMMUNITY
Start: 2021-06-17 | End: 1900-01-01

## 2021-06-19 ENCOUNTER — OUTPATIENT (OUTPATIENT)
Dept: OUTPATIENT SERVICES | Facility: HOSPITAL | Age: 36
LOS: 1 days | End: 2021-06-19
Payer: MEDICAID

## 2021-06-19 DIAGNOSIS — Z11.52 ENCOUNTER FOR SCREENING FOR COVID-19: ICD-10-CM

## 2021-06-19 DIAGNOSIS — Z98.890 OTHER SPECIFIED POSTPROCEDURAL STATES: Chronic | ICD-10-CM

## 2021-06-19 LAB — SARS-COV-2 RNA SPEC QL NAA+PROBE: SIGNIFICANT CHANGE UP

## 2021-06-19 PROCEDURE — C9803: CPT

## 2021-06-19 PROCEDURE — U0005: CPT

## 2021-06-19 PROCEDURE — U0003: CPT

## 2021-06-21 ENCOUNTER — TRANSCRIPTION ENCOUNTER (OUTPATIENT)
Age: 36
End: 2021-06-21

## 2021-06-21 RX ORDER — SODIUM CHLORIDE 9 MG/ML
1000 INJECTION, SOLUTION INTRAVENOUS
Refills: 0 | Status: DISCONTINUED | OUTPATIENT
Start: 2021-06-22 | End: 2021-07-06

## 2021-06-21 RX ORDER — ONDANSETRON 8 MG/1
4 TABLET, FILM COATED ORAL ONCE
Refills: 0 | Status: DISCONTINUED | OUTPATIENT
Start: 2021-06-22 | End: 2021-07-06

## 2021-06-22 ENCOUNTER — APPOINTMENT (OUTPATIENT)
Dept: OTOLARYNGOLOGY | Facility: HOSPITAL | Age: 36
End: 2021-06-22

## 2021-06-22 ENCOUNTER — OUTPATIENT (OUTPATIENT)
Dept: OUTPATIENT SERVICES | Facility: HOSPITAL | Age: 36
LOS: 1 days | End: 2021-06-22
Payer: MEDICAID

## 2021-06-22 ENCOUNTER — RESULT REVIEW (OUTPATIENT)
Age: 36
End: 2021-06-22

## 2021-06-22 VITALS
DIASTOLIC BLOOD PRESSURE: 91 MMHG | HEIGHT: 67 IN | TEMPERATURE: 98 F | RESPIRATION RATE: 16 BRPM | OXYGEN SATURATION: 97 % | SYSTOLIC BLOOD PRESSURE: 143 MMHG | WEIGHT: 175.05 LBS | HEART RATE: 69 BPM

## 2021-06-22 VITALS
DIASTOLIC BLOOD PRESSURE: 86 MMHG | SYSTOLIC BLOOD PRESSURE: 132 MMHG | HEART RATE: 66 BPM | RESPIRATION RATE: 16 BRPM | OXYGEN SATURATION: 96 %

## 2021-06-22 DIAGNOSIS — Z98.890 OTHER SPECIFIED POSTPROCEDURAL STATES: Chronic | ICD-10-CM

## 2021-06-22 DIAGNOSIS — R49.0 DYSPHONIA: ICD-10-CM

## 2021-06-22 DIAGNOSIS — J35.1 HYPERTROPHY OF TONSILS: ICD-10-CM

## 2021-06-22 DIAGNOSIS — J35.2 HYPERTROPHY OF ADENOIDS: ICD-10-CM

## 2021-06-22 PROCEDURE — 42821 REMOVE TONSILS AND ADENOIDS: CPT

## 2021-06-22 PROCEDURE — 88304 TISSUE EXAM BY PATHOLOGIST: CPT | Mod: 26

## 2021-06-22 PROCEDURE — 88304 TISSUE EXAM BY PATHOLOGIST: CPT

## 2021-06-22 PROCEDURE — C9399: CPT

## 2021-06-22 RX ORDER — OXYCODONE HYDROCHLORIDE 5 MG/1
5 TABLET ORAL ONCE
Refills: 0 | Status: DISCONTINUED | OUTPATIENT
Start: 2021-06-22 | End: 2021-06-22

## 2021-06-22 RX ORDER — IBUPROFEN 200 MG
600 TABLET ORAL ONCE
Refills: 0 | Status: COMPLETED | OUTPATIENT
Start: 2021-06-22 | End: 2021-06-22

## 2021-06-22 RX ADMIN — OXYCODONE HYDROCHLORIDE 5 MILLIGRAM(S): 5 TABLET ORAL at 12:50

## 2021-06-22 RX ADMIN — OXYCODONE HYDROCHLORIDE 5 MILLIGRAM(S): 5 TABLET ORAL at 13:40

## 2021-06-22 RX ADMIN — Medication 600 MILLIGRAM(S): at 13:40

## 2021-06-22 RX ADMIN — Medication 600 MILLIGRAM(S): at 12:50

## 2021-06-22 NOTE — ASU DISCHARGE PLAN (ADULT/PEDIATRIC) - ASU DC SPECIAL INSTRUCTIONSFT
Activity: As tolerated. No driving or making important decisions for 24 hours. No driving while on prescription pain medication.     Diet: Start with clear liquids then advance to full liquids then soft diet. No hard or crunchy foods for 2 weeks. Drink lots of fluids.     Pain management: Prescriptions have been sent to your pharmacy. Tablets were sent in. Please crush and take with some liquid or pudding/applesauce. Alternate the ibuprofen (motrin) and acetaminophen (tylenol).  You should be taking two 500mg (or 3 325mg) tylenol every eight hours.  With this you should take 600mg of Ibuprofen every eight hours.  They should be taken together. Take pain medication around the clock for the first few days to stay on top of the pain. In addition to those, oxycodone 5mg tablets have been sent in to the pharmacy. These are for more severe pain and you can take 1-2 tablets every 4-6 hours. You can take those at the same time as the acetaminophen or the ibuprofen. Do not exceed 3 grams of acetaminophen (six 500mg tablets) every 24 hours.     Bleeding: If any spitting up blood call Dr. Maradiaga office immediately at [phone]. If bleeding heavily or feel lightheaded then immediately call 911 or go to nearest Emergency Department. If close, prefer you go to [facility] at [address].     Fever: Low grade fever is common after surgery. If it does not respond to cool compresses and tylenol/motrin then call Dr. Maradiaga office at [phone].    Follow Up: If you do not already have a follow up appt then call Dr. Maradiaga office at [phone] to make an appointment for about 4 weeks to be sure everything is healed up well.

## 2021-06-22 NOTE — ASU PATIENT PROFILE, ADULT - PMH
H/O tonsillitis  and Adenoiditis  hospitalized at [facility]  for 2 days April 2021  Marijuana smoker  sober for 51 days  MRSA infection  right upper extremity

## 2021-06-22 NOTE — ASU DISCHARGE PLAN (ADULT/PEDIATRIC) - NURSING INSTRUCTIONS
You may start taking tylenol after 530 pm as needed for pain. You may start taking tylenol after 530 pm as needed for pain.  You may start taking ibuprofen after 850pm as needed for pain.

## 2021-06-22 NOTE — ASU PATIENT PROFILE, ADULT - NSALCOHOLSOBERPERIOD_GEN_A_CORE_FT
Spironolactone Pregnancy And Lactation Text: This medication can cause feminization of the male fetus and should be avoided during pregnancy. The active metabolite is also found in breast milk. Topical Clindamycin Counseling: Patient counseled that this medication may cause skin irritation or allergic reactions.  In the event of skin irritation, the patient was advised to reduce the amount of the drug applied or use it less frequently.   The patient verbalized understanding of the proper use and possible adverse effects of clindamycin.  All of the patient's questions and concerns were addressed. Detail Level: Zone Doxycycline Pregnancy And Lactation Text: This medication is Pregnancy Category D and not consider safe during pregnancy. It is also excreted in breast milk but is considered safe for shorter treatment courses. Tazorac Pregnancy And Lactation Text: This medication is not safe during pregnancy. It is unknown if this medication is excreted in breast milk. Tetracycline Counseling: Patient counseled regarding possible photosensitivity and increased risk for sunburn.  Patient instructed to avoid sunlight, if possible.  When exposed to sunlight, patients should wear protective clothing, sunglasses, and sunscreen.  The patient was instructed to call the office immediately if the following severe adverse effects occur:  hearing changes, easy bruising/bleeding, severe headache, or vision changes.  The patient verbalized understanding of the proper use and possible adverse effects of tetracycline.  All of the patient's questions and concerns were addressed. Patient understands to avoid pregnancy while on therapy due to potential birth defects. Minocycline Counseling: Patient advised regarding possible photosensitivity and discoloration of the teeth, skin, lips, tongue and gums.  Patient instructed to avoid sunlight, if possible.  When exposed to sunlight, patients should wear protective clothing, sunglasses, and sunscreen.  The patient was instructed to call the office immediately if the following severe adverse effects occur:  hearing changes, easy bruising/bleeding, severe headache, or vision changes.  The patient verbalized understanding of the proper use and possible adverse effects of minocycline.  All of the patient's questions and concerns were addressed. Topical Clindamycin Pregnancy And Lactation Text: This medication is Pregnancy Category B and is considered safe during pregnancy. It is unknown if it is excreted in breast milk. Bactrim Pregnancy And Lactation Text: This medication is Pregnancy Category D and is known to cause fetal risk.  It is also excreted in breast milk. Topical Sulfur Applications Pregnancy And Lactation Text: This medication is Pregnancy Category C and has an unknown safety profile during pregnancy. It is unknown if this topical medication is excreted in breast milk. Azithromycin Counseling:  I discussed with the patient the risks of azithromycin including but not limited to GI upset, allergic reaction, drug rash, diarrhea, and yeast infections. Tetracycline Pregnancy And Lactation Text: This medication is Pregnancy Category D and not consider safe during pregnancy. It is also excreted in breast milk. Topical Sulfur Applications Counseling: Topical Sulfur Counseling: Patient counseled that this medication may cause skin irritation or allergic reactions.  In the event of skin irritation, the patient was advised to reduce the amount of the drug applied or use it less frequently.   The patient verbalized understanding of the proper use and possible adverse effects of topical sulfur application.  All of the patient's questions and concerns were addressed. Erythromycin Pregnancy And Lactation Text: This medication is Pregnancy Category B and is considered safe during pregnancy. It is also excreted in breast milk. Dapsone Counseling: I discussed with the patient the risks of dapsone including but not limited to hemolytic anemia, agranulocytosis, rashes, methemoglobinemia, kidney failure, peripheral neuropathy, headaches, GI upset, and liver toxicity.  Patients who start dapsone require monitoring including baseline LFTs and weekly CBCs for the first month, then every month thereafter.  The patient verbalized understanding of the proper use and possible adverse effects of dapsone.  All of the patient's questions and concerns were addressed. Sarecycline Counseling: Patient advised regarding possible photosensitivity and discoloration of the teeth, skin, lips, tongue and gums.  Patient instructed to avoid sunlight, if possible.  When exposed to sunlight, patients should wear protective clothing, sunglasses, and sunscreen.  The patient was instructed to call the office immediately if the following severe adverse effects occur:  hearing changes, easy bruising/bleeding, severe headache, or vision changes.  The patient verbalized understanding of the proper use and possible adverse effects of sarecycline.  All of the patient's questions and concerns were addressed. Isotretinoin Counseling: Patient should get monthly blood tests, not donate blood, not drive at night if vision affected, not share medication, and not undergo elective surgery for 6 months after tx completed. Side effects reviewed, pt to contact office should one occur. Birth Control Pills Counseling: Birth Control Pill Counseling: I discussed with the patient the potential side effects of OCPs including but not limited to increased risk of stroke, heart attack, thrombophlebitis, deep venous thrombosis, hepatic adenomas, breast changes, GI upset, headaches, and depression.  The patient verbalized understanding of the proper use and possible adverse effects of OCPs. All of the patient's questions and concerns were addressed. Spironolactone Counseling: Patient advised regarding risks of diarrhea, abdominal pain, hyperkalemia, birth defects (for female patients), liver toxicity and renal toxicity. The patient may need blood work to monitor liver and kidney function and potassium levels while on therapy. The patient verbalized understanding of the proper use and possible adverse effects of spironolactone.  All of the patient's questions and concerns were addressed. Use Enhanced Medication Counseling?: No Doxycycline Counseling:  Patient counseled regarding possible photosensitivity and increased risk for sunburn.  Patient instructed to avoid sunlight, if possible.  When exposed to sunlight, patients should wear protective clothing, sunglasses, and sunscreen.  The patient was instructed to call the office immediately if the following severe adverse effects occur:  hearing changes, easy bruising/bleeding, severe headache, or vision changes.  The patient verbalized understanding of the proper use and possible adverse effects of doxycycline.  All of the patient's questions and concerns were addressed. Dapsone Pregnancy And Lactation Text: This medication is Pregnancy Category C and is not considered safe during pregnancy or breast feeding. denies etoh use for the past 57 days Tazorac Counseling:  Patient advised that medication is irritating and drying.  Patient may need to apply sparingly and wash off after an hour before eventually leaving it on overnight.  The patient verbalized understanding of the proper use and possible adverse effects of tazorac.  All of the patient's questions and concerns were addressed. Topical Retinoid counseling:  Patient advised to apply a pea-sized amount only at bedtime and wait 30 minutes after washing their face before applying.  If too drying, patient may add a non-comedogenic moisturizer. The patient verbalized understanding of the proper use and possible adverse effects of retinoids.  All of the patient's questions and concerns were addressed. High Dose Vitamin A Pregnancy And Lactation Text: High dose vitamin A therapy is contraindicated during pregnancy and breast feeding. Erythromycin Counseling:  I discussed with the patient the risks of erythromycin including but not limited to GI upset, allergic reaction, drug rash, diarrhea, increase in liver enzymes, and yeast infections. Birth Control Pills Pregnancy And Lactation Text: This medication should be avoided if pregnant and for the first 30 days post-partum. Benzoyl Peroxide Pregnancy And Lactation Text: This medication is Pregnancy Category C. It is unknown if benzoyl peroxide is excreted in breast milk. Azithromycin Pregnancy And Lactation Text: This medication is considered safe during pregnancy and is also secreted in breast milk. Bactrim Counseling:  I discussed with the patient the risks of sulfa antibiotics including but not limited to GI upset, allergic reaction, drug rash, diarrhea, dizziness, photosensitivity, and yeast infections.  Rarely, more serious reactions can occur including but not limited to aplastic anemia, agranulocytosis, methemoglobinemia, blood dyscrasias, liver or kidney failure, lung infiltrates or desquamative/blistering drug rashes. Topical Retinoid Pregnancy And Lactation Text: This medication is Pregnancy Category C. It is unknown if this medication is excreted in breast milk. High Dose Vitamin A Counseling: Side effects reviewed, pt to contact office should one occur. Isotretinoin Pregnancy And Lactation Text: This medication is Pregnancy Category X and is considered extremely dangerous during pregnancy. It is unknown if it is excreted in breast milk. Benzoyl Peroxide Counseling: Patient counseled that medicine may cause skin irritation and bleach clothing.  In the event of skin irritation, the patient was advised to reduce the amount of the drug applied or use it less frequently.   The patient verbalized understanding of the proper use and possible adverse effects of benzoyl peroxide.  All of the patient's questions and concerns were addressed.

## 2021-06-22 NOTE — ASU PATIENT PROFILE, ADULT - PSH
History of incision and drainage  right upper extremity secondary to mrsa infection approx 6 years ago

## 2021-06-22 NOTE — ASU DISCHARGE PLAN (ADULT/PEDIATRIC) - CALL YOUR DOCTOR IF YOU HAVE ANY OF THE FOLLOWING:
Bleeding that does not stop/Unable to urinate/Excessive diarrhea/Inability to tolerate liquids or foods

## 2021-06-23 ENCOUNTER — NON-APPOINTMENT (OUTPATIENT)
Age: 36
End: 2021-06-23

## 2021-06-30 ENCOUNTER — EMERGENCY (EMERGENCY)
Facility: HOSPITAL | Age: 36
LOS: 1 days | Discharge: HOME | End: 2021-06-30
Attending: EMERGENCY MEDICINE
Payer: MEDICAID

## 2021-06-30 ENCOUNTER — APPOINTMENT (OUTPATIENT)
Dept: OTOLARYNGOLOGY | Facility: CLINIC | Age: 36
End: 2021-06-30
Payer: MEDICAID

## 2021-06-30 VITALS
HEART RATE: 71 BPM | SYSTOLIC BLOOD PRESSURE: 145 MMHG | HEIGHT: 67 IN | DIASTOLIC BLOOD PRESSURE: 100 MMHG | WEIGHT: 169 LBS | BODY MASS INDEX: 26.53 KG/M2 | TEMPERATURE: 97.6 F

## 2021-06-30 VITALS
HEIGHT: 67 IN | OXYGEN SATURATION: 96 % | SYSTOLIC BLOOD PRESSURE: 158 MMHG | WEIGHT: 169.98 LBS | TEMPERATURE: 99 F | DIASTOLIC BLOOD PRESSURE: 117 MMHG | HEART RATE: 90 BPM | RESPIRATION RATE: 20 BRPM

## 2021-06-30 VITALS
OXYGEN SATURATION: 98 % | DIASTOLIC BLOOD PRESSURE: 91 MMHG | RESPIRATION RATE: 20 BRPM | HEART RATE: 82 BPM | SYSTOLIC BLOOD PRESSURE: 163 MMHG | TEMPERATURE: 99 F

## 2021-06-30 DIAGNOSIS — J35.2 HYPERTROPHY OF ADENOIDS: ICD-10-CM

## 2021-06-30 DIAGNOSIS — J35.8 OTHER CHRONIC DISEASES OF TONSILS AND ADENOIDS: ICD-10-CM

## 2021-06-30 DIAGNOSIS — J35.1 HYPERTROPHY OF TONSILS: ICD-10-CM

## 2021-06-30 DIAGNOSIS — Z98.890 OTHER SPECIFIED POSTPROCEDURAL STATES: Chronic | ICD-10-CM

## 2021-06-30 PROCEDURE — 42960 CONTROL THROAT BLEEDING: CPT

## 2021-06-30 PROCEDURE — 99284 EMERGENCY DEPT VISIT MOD MDM: CPT

## 2021-06-30 PROCEDURE — 99024 POSTOP FOLLOW-UP VISIT: CPT

## 2021-06-30 PROCEDURE — 99283 EMERGENCY DEPT VISIT LOW MDM: CPT

## 2021-06-30 RX ORDER — TRANEXAMIC ACID 100 MG/ML
5 INJECTION, SOLUTION INTRAVENOUS ONCE
Refills: 0 | Status: COMPLETED | OUTPATIENT
Start: 2021-06-30 | End: 2021-06-30

## 2021-06-30 RX ADMIN — TRANEXAMIC ACID 5 MILLILITER(S): 100 INJECTION, SOLUTION INTRAVENOUS at 06:38

## 2021-06-30 NOTE — ED ADULT NURSE NOTE - NSIMPLEMENTINTERV_GEN_ALL_ED
Implemented All Universal Safety Interventions:  [place] to call system. Call bell, personal items and telephone within reach. Instruct patient to call for assistance. Room bathroom lighting operational. Non-slip footwear when patient is off stretcher. Physically safe environment: no spills, clutter or unnecessary equipment. Stretcher in lowest position, wheels locked, appropriate side rails in place.

## 2021-06-30 NOTE — ASSESSMENT
[FreeTextEntry1] : S/p T&A 6/22/21, now with some bleeding last night:\par - Clot removed from left.  No active bleeding noted \par - Continue with Tylenol and Motrin. \par - very soft diet only x 1 week \par - F/U in 2 weeks

## 2021-06-30 NOTE — CONSULT NOTE ADULT - ASSESSMENT
36 yo M who recently had a tonsillectomy 8 days ago by Dr Maradiaga p/w bleeding. Patient seen in the ED with minimal bleeding. Patient told to gargle with ice water.

## 2021-06-30 NOTE — ED PROVIDER NOTE - OBJECTIVE STATEMENT
34yo M who recently had a tonsillectomy 8 days ago p/w bleeding. Pt endorses that after his procedure he had no issues and did not have significant bleeding. 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 He then noticed some bleeding from the throat that he was spitting up and so he was concerned and called his doctor's office who told him to come in. He denies any recent fevers, chills, vomiting, diarrhea, tarry stool, fatigue, shortness of breath or other major changes.

## 2021-06-30 NOTE — ED ADULT NURSE NOTE - OBJECTIVE STATEMENT
36y/o M coming to the ED c/o bleeding from his throat. 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 Pt called his ENT who requested him to come ot the ED to be seen. Pt denies any CP/SOB/Fever/Chills/N/V/D. Pt denies any difficulty breathing or swallowing. On exam, bleeding noted on the L side with tissue. VSS.

## 2021-06-30 NOTE — CONSULT NOTE ADULT - SUBJECTIVE AND OBJECTIVE BOX
CC: postop bleeding from tonsillectomy     HPI:  34yo M who recently had a tonsillectomy 8 days ago by Dr Maradiaga p/w bleeding. Pt endorses that after his procedure he had no issues and did not have significant bleeding. 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 He then noticed some bleeding from the throat that he was spitting up and so he was concerned and called his doctor's office who told him to come in. He denies any recent fevers, chills, vomiting, diarrhea, tarry stool, fatigue, shortness of breath or other major changes.        PAST MEDICAL & SURGICAL HISTORY:  Marijuana smoker  sober for 51 days    H/O tonsillitis  and Adenoiditis  hospitalized at [facility]  for 2 days April 2021    MRSA infection  right upper extremity    History of incision and drainage  right upper extremity secondary to mrsa infection approx 6 years ago      Allergies    Bactrim (Hives)  morphine (Rash)    Intolerances      MEDICATIONS  (STANDING):  tranexamic acid Injectable for Topical Use 5 milliLiter(s) Topical once    MEDICATIONS  (PRN):      Social History: doesn't smoke previous Marijuana smoker    Family history: no significant family history     ROS:   ENT: all negative except as noted in HPI   CV: denies palpitations  Pulm: denies SOB, cough, hemoptysis  GI: denies change in apetite, indigestion, n/v  : denies pertinent urinary symptoms, urgency  Neuro: denies numbness/tingling, loss of sensation  Psych: denies anxiety  MS: denies muscle weakness, instability  Heme: denies easy bruising or bleeding  Endo: denies heat/cold intolerance, excessive sweating  Vascular: denies LE edema    Vital Signs Last 24 Hrs  T(C): 37.1 (30 Jun 2021 04:49), Max: 37.1 (30 Jun 2021 04:49)  T(F): 98.7 (30 Jun 2021 04:49), Max: 98.7 (30 Jun 2021 04:49)  HR: 90 (30 Jun 2021 04:49) (90 - 90)  BP: 158/117 (30 Jun 2021 04:49) (158/117 - 158/117)  BP(mean): --  RR: 20 (30 Jun 2021 04:49) (20 - 20)  SpO2: 96% (30 Jun 2021 04:49) (96% - 96%)              PHYSICAL EXAM:  Gen: NAD  Skin: No rashes, bruises, or lesions  Head: Normocephalic, Atraumatic  Face: no edema, erythema, or fluctuance. Parotid glands soft without mass  Eyes: no scleral injection  Nose: Nares bilaterally patent, no discharge  Mouth: No Stridor / Drooling / Trismus.  Mucosa moist, tongue/uvula midline, oropharynx clear  Neck: Flat, supple, no lymphadenopathy, trachea midline, no masses  Lymphatic: No lymphadenopathy  Resp: breathing easily, no stridor  CV: no peripheral edema/cyanosis  GI: nondistended   Peripheral vascular: no JVD or edema  Neuro: facial nerve intact, no facial droop

## 2021-06-30 NOTE — PHYSICAL EXAM
[Normal] : normal appearance, well groomed, well nourished, and in no acute distress [Removed] : palatine tonsils previously removed [de-identified] : Left with large clot - removed with suction.  No active bleeding noted.

## 2021-06-30 NOTE — ED PROVIDER NOTE - ATTENDING CONTRIBUTION TO CARE
Pt has small amt of dried blood over L tonsil, is breathing well, no airway compromise or swelling, no large hemorrhage, is denying signs that would be concerning for acute blood loss anemia. evaluated by ENT who do not think there is anything acute out of what is expected during recovery from pt's surgery. pt offered txa gargle to help with small amt of bleeding. pt tolerating PO, safe for d/c with strict return precautions if sx worsen, f/u w ENT as outpt.

## 2021-06-30 NOTE — ED ADULT TRIAGE NOTE - CHIEF COMPLAINT QUOTE
tonsils removed on Tuesday. "I feel like my stitches burst" at approximately 4 AM tonight. Denies difficulty breathing. Bleeding to the left side of the back of throat, spitting up blood.

## 2021-06-30 NOTE — ED PROVIDER NOTE - CLINICAL SUMMARY MEDICAL DECISION MAKING FREE TEXT BOX
35M with tonsillar bleeding after recent tonsillectomy. Bleeding looks well controlled and patient without any symptoms of heavy blood loss. ENT was consulted and will follow up with their recommendations. Likely ok to be discharged back home.

## 2021-06-30 NOTE — CONSULT NOTE ADULT - PROBLEM SELECTOR RECOMMENDATION 9
stable for discharge   F/U with Dr Maradiaga as an outpatient   Continue to gargle iced water   soft diet

## 2021-06-30 NOTE — ED ADULT NURSE NOTE - PMH
H/O tonsillitis  and Adenoiditis  hospitalized at [facility]  for 2 days April 2021  Marijuana smoker  sober for 51 days  MRSA infection  right upper extremity

## 2021-06-30 NOTE — ED PROVIDER NOTE - ENMT, MLM
Airway patent, Nasal mucosa clear. Blood present in posterior pharynx with a blood clot and tonsillar tissue present. No rapid bleeding; slow oozing present.

## 2021-06-30 NOTE — ED PROVIDER NOTE - PATIENT PORTAL LINK FT
You can access the FollowMyHealth Patient Portal offered by [facility] by registering at the following website: [email or web address]. By joining [email or web address] FollowMyHealth portal, you will also be able to view your health information using other applications (apps) compatible with our system.

## 2021-06-30 NOTE — ED PROVIDER NOTE - NSFOLLOWUPINSTRUCTIONS_ED_ALL_ED_FT
You were evaluated in the emergency department for tonsillar bleeding that you developed last night. At this time it looks like the bleeding is relatively slow and you look well so you are being discharged. Please follow up with your ENT surgeon and see him in his office either today or tomorrow for a repeat evaluation. Please return to the emergency department immediately if you develop any new fevers, any worsening swelling resulting in difficulty breathing, worsening cough or an inability to tolerate any food or water.

## 2021-06-30 NOTE — HISTORY OF PRESENT ILLNESS
[de-identified] : s/p T&A 6/22.  Notes last night had blood in his sputum.  Went to ED this morning where he gargled with TXA and the bleeding stopped and D/C'ed with F/U here.\par He notes able to eat soft foods and drink.   Pain is improving.  Nasal congestion is no improving.

## 2021-06-30 NOTE — ED PROVIDER NOTE - PMH
H/O tonsillitis  and Adenoiditis  hospitalized at [facility]  for 2 days April 2021  Marijuana smoker  sober for 51 days  MRSA infection  right upper extremity

## 2021-07-02 ENCOUNTER — APPOINTMENT (OUTPATIENT)
Dept: OTOLARYNGOLOGY | Facility: CLINIC | Age: 36
End: 2021-07-02
Payer: MEDICAID

## 2021-07-02 VITALS — TEMPERATURE: 97.6 F | BODY MASS INDEX: 26.53 KG/M2 | HEIGHT: 67 IN | WEIGHT: 169 LBS

## 2021-07-02 VITALS — DIASTOLIC BLOOD PRESSURE: 96 MMHG | HEART RATE: 64 BPM | SYSTOLIC BLOOD PRESSURE: 148 MMHG

## 2021-07-02 DIAGNOSIS — Z90.89 ACQUIRED ABSENCE OF OTHER ORGANS: ICD-10-CM

## 2021-07-02 PROCEDURE — 99024 POSTOP FOLLOW-UP VISIT: CPT

## 2021-07-02 RX ORDER — OXYCODONE 5 MG/1
5 TABLET ORAL EVERY 6 HOURS
Qty: 20 | Refills: 0 | Status: ACTIVE | COMMUNITY
Start: 2021-07-02 | End: 1900-01-01

## 2021-07-06 NOTE — PHYSICAL EXAM
[Normal] : normal appearance, well groomed, well nourished, and in no acute distress [de-identified] : Normal post operative changes.  Left superior and mid tonsil bed with mild bleeding.  Areas cauterized with Silver nitrate.  Bleeding controlled.

## 2021-07-06 NOTE — ASSESSMENT
[FreeTextEntry1] : S/P T&A 6/22/21, bleeding from left tonsil bed:\par - Cauterized in office today\par - refilling oxycodone\par - Continue with Motrin and Tylenol around the clock\par - Discussed the need for good hydration\par - Continue to do soft diet until f/u.\par - Keep f/u with Dr. Lerma as scheduled.

## 2021-07-06 NOTE — HISTORY OF PRESENT ILLNESS
[de-identified] : S/P T&A 6/22/21.  Noted to have some bleeding, seen on 6/30 and no active bleeding noted at that time.  Now with feeling of blood in back of throat and spitting out blood for past 2 days.  Pain is getting better.

## 2021-07-21 ENCOUNTER — APPOINTMENT (OUTPATIENT)
Dept: OTOLARYNGOLOGY | Facility: CLINIC | Age: 36
End: 2021-07-21
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
## 2021-10-07 NOTE — ED ADULT TRIAGE NOTE - CCCP TRG CHIEF CMPLNT
throat, pain Hydroquinone Counseling:  Patient advised that medication may result in skin irritation, lightening (hypopigmentation), dryness, and burning.  In the event of skin irritation, the patient was advised to reduce the amount of the drug applied or use it less frequently.  Rarely, spots that are treated with hydroquinone can become darker (pseudoochronosis).  Should this occur, patient instructed to stop medication and call the office. The patient verbalized understanding of the proper use and possible adverse effects of hydroquinone.  All of the patient's questions and concerns were addressed.

## 2022-04-22 ENCOUNTER — OUTPATIENT (OUTPATIENT)
Dept: OUTPATIENT SERVICES | Facility: HOSPITAL | Age: 37
LOS: 1 days | End: 2022-04-22

## 2022-04-22 DIAGNOSIS — Z98.890 OTHER SPECIFIED POSTPROCEDURAL STATES: Chronic | ICD-10-CM

## 2022-04-22 DIAGNOSIS — Z20.822 CONTACT WITH AND (SUSPECTED) EXPOSURE TO COVID-19: ICD-10-CM

## 2022-04-22 LAB — SARS-COV-2 RNA SPEC QL NAA+PROBE: SIGNIFICANT CHANGE UP

## 2022-07-30 ENCOUNTER — EMERGENCY (EMERGENCY)
Facility: HOSPITAL | Age: 37
LOS: 1 days | Discharge: HOME | End: 2022-07-30
Attending: STUDENT IN AN ORGANIZED HEALTH CARE EDUCATION/TRAINING PROGRAM | Admitting: STUDENT IN AN ORGANIZED HEALTH CARE EDUCATION/TRAINING PROGRAM

## 2022-07-30 VITALS
HEART RATE: 89 BPM | OXYGEN SATURATION: 98 % | DIASTOLIC BLOOD PRESSURE: 89 MMHG | SYSTOLIC BLOOD PRESSURE: 150 MMHG | HEIGHT: 67 IN | WEIGHT: 169.98 LBS | TEMPERATURE: 98 F | RESPIRATION RATE: 18 BRPM

## 2022-07-30 DIAGNOSIS — Z98.890 OTHER SPECIFIED POSTPROCEDURAL STATES: Chronic | ICD-10-CM

## 2022-07-30 PROCEDURE — 99284 EMERGENCY DEPT VISIT MOD MDM: CPT

## 2022-07-30 RX ORDER — LIDOCAINE 4 G/100G
1 CREAM TOPICAL ONCE
Refills: 0 | Status: COMPLETED | OUTPATIENT
Start: 2022-07-30 | End: 2022-07-30

## 2022-07-30 RX ORDER — KETOROLAC TROMETHAMINE 30 MG/ML
30 SYRINGE (ML) INJECTION ONCE
Refills: 0 | Status: DISCONTINUED | OUTPATIENT
Start: 2022-07-30 | End: 2022-07-30

## 2022-07-30 RX ORDER — ACETAMINOPHEN 500 MG
975 TABLET ORAL ONCE
Refills: 0 | Status: COMPLETED | OUTPATIENT
Start: 2022-07-30 | End: 2022-07-30

## 2022-07-30 RX ADMIN — Medication 975 MILLIGRAM(S): at 22:55

## 2022-07-30 RX ADMIN — LIDOCAINE 1 PATCH: 4 CREAM TOPICAL at 22:54

## 2022-07-30 RX ADMIN — Medication 30 MILLIGRAM(S): at 22:55

## 2022-07-30 NOTE — ED PROVIDER NOTE - PATIENT PORTAL LINK FT
You can access the FollowMyHealth Patient Portal offered by [facility] by registering at the following website: [email or web address]. By joining [facility]’s FollowMyHealth portal, you will also be able to view your health information using other applications (apps) compatible with our system.

## 2022-07-30 NOTE — ED PROVIDER NOTE - NSFOLLOWUPINSTRUCTIONS_ED_ALL_ED_FT
Follow with your PMD within 48-72 hours.      Rest, no heavy lifting.      Warm compresses to area.     Recommend Ortho consult to discuss possible MRI vs Physical Therapy    Take Motrin 600 mg every 6-8 hours for pain with food,     Tylenol 1000mg every 8 hours as needed for pain     Valium 2mg every 8 hours as needed for muscle spasm- caution drowsiness/do not drive.     Any worsening pain, weakness, numbness, bowel or urinary incontinence return to ER

## 2022-07-30 NOTE — ED PROVIDER NOTE - CLINICAL SUMMARY MEDICAL DECISION MAKING FREE TEXT BOX
37 M presenting with L cervical radiculopathy-- plan for pain control, and dc home with meds and ortho follow up . pt took cab here today.

## 2022-07-30 NOTE — ED ADULT TRIAGE NOTE - AS TEMP SITE
-- Message is from the [facility] Contact [facility]--    Reason for Call: Patient is scheduled for 11/21/19 for consult with Dr Oconnell would like to know if medical records have been received.       Alternative phone number:     Turnaround time given to caller:   This message will be sent to [state Provider's name]. The clinical team will fulfill your request as soon as they review your message. Please be aware that you can also contact your physician by entering your message in [email or web address], our online portal.   temporal

## 2022-07-30 NOTE — ED ADULT NURSE NOTE - NSICDXPASTMEDICALHX_GEN_ALL_CORE_FT
PAST MEDICAL HISTORY:  H/O tonsillitis and Adenoiditis  hospitalized at [facility]  for 2 days April 2021    Marijuana smoker sober for 51 days    MRSA infection right upper extremity

## 2022-07-30 NOTE — ED PROVIDER NOTE - OBJECTIVE STATEMENT
38 yo M presenting with 2 weeks of L sided neck pain radiating down L arm with associated paresthesias to lateral arm and 4th and 5th digits. Denies trauma/falls. Reports that symptoms started while playing basketball. Has not taken anything for pain. No fevers/chills. Notes pain has been worse over the last two days with increased stiffness. Admits to ETOH use and smoking weed today. No IVDU.

## 2022-07-30 NOTE — ED PROVIDER NOTE - NS ED ATTENDING STATEMENT MOD
Severe protein-calorie malnutrition Attending Only This was a shared visit with the ISADORA. I reviewed and verified the documentation and independently performed the documented:

## 2022-07-30 NOTE — ED PROVIDER NOTE - PHYSICAL EXAMINATION
VITALS: reviewed  GEN: NAD, A & O x 4  HEAD/EYES: NCAT, EOMI, anicteric sclerae  ENT: mucus membranes moist, oropharynx WNL, trachea midline  RESP: lungs CTA with equal breath sounds bilaterally, chest wall nontender and atraumatic  CV: distal pulses intact and symmetric bilaterally  MSK: extremities atraumatic and nontender, no edema, no asymmetry. the back is without midline or lateral tenderness, there is no spinal deformity or stepoff and the back is ranged painlessly. the neck has midline cervical tenderness, and tenderness to L lateral neck.   SKIN: warm, dry, no rash, no bruising, no cyanosis. color appropriate for ethnicity  NEURO: alert, mentating appropriately, no facial asymmetry. gross sensation, motor, coordination are intact. paresthesias to lateral arm and 4th/5th digit. strength intact.   PSYCH: Affect appropriate

## 2022-07-30 NOTE — ED ADULT TRIAGE NOTE - CHIEF COMPLAINT QUOTE
pt A&Ox3 c/o neck pain x 1-2 weeks. denies injury, c/o L arm numbness/tingling. decreased ROM. denies fevers, chills.

## 2022-07-30 NOTE — ED PROVIDER NOTE - PROGRESS NOTE DETAILS
MERA Hernandez: patient signed out to me to follow up Ct, ct negative. patient pain improved. ok for dc home.

## 2022-07-30 NOTE — ED ADULT NURSE NOTE - NSIMPLEMENTINTERV_GEN_ALL_ED
DOI: 07/21/18  Initial Treatment Date: 07/19/18  Date Last Seen: 04/25/19  [facility] Onset: other  Occupation:   Referring by: Self  Primary Care Physician: Marina Reid,   Date informed consent signed:  Initial Visit  ABN:N/A    I have reviewed the past medical history, family history, social history, medications and allergies listed in the medical record as obtained by my nursing staff and support staff and agree with their documentation  Ana  reports that she has never smoked. She has never used smokeless tobacco.  Ana is allergic to cymbalta [duloxetine hcl]; lyrica; emollient; gabapentin; lantus; nylon; and ortho tri-cyclen [norgestimate-eth estradiol].  Advance Directive Status:None   Visit Number of Current Episode: 14      Subjective: Ana is a 51 year old female who comes in today for evaluation and treatment of he back. Patient relates that she is here today because “her  made her come”. Patient complains of lower back pain that has been significantly worse over the past 2 weeks has radiating symptoms into the dorsum lateral aspect of the foot as well as the third, fourth and fifth digits of the feet bilaterally. Patient relates that she has had fevers and chills recently. She reports pain with coughing, sneezing and straining to make a bowel movement in the lower back. Patient states that the leg weakness has been intermittent. She does not report any traumas, falls or accidents recently. Patient does not report any unexplained weight loss. Patient rates her pain level today at 8/10 with 10 being the worst.    Objective:  Patient is ambulatory with a cane and has a very slow gait. Flexed antalgia is visualized with positive slump sign.  Active range of motion the lumbar spine has pain with all movements with deficits of approximately 10° flexion and extension as well as bilateral bending. Decreased 5° bilateral rotation.  Muscle testing the lower extremities are +5 bilaterally,  deep tendon reflexes are +2 bilaterally.  Positive straight and well leg raise eliciting ipsilateral pain down the ankles. Positive Larose for lower back pain. Positive bilateral Yeomans and Nachlas as well as right Ely's test.  Negative left Ely's test test.  Hypersensitivity is palpable in the lower lumbar spine. Restrictions are noted at L4 and L5 with associated paraspinal tenderness. Significant tightness in the quadriceps and hamstrings bilaterally.      Relevant Diagnostic Imaging/Testing:   MRI of the lumbar spine without contrast on March 18, 2015:  1. Moderate degenerative disc disease at T11-T12. There is minimal  anterior cord flattening at this level without cord contact or significant  spinal canal stenosis.  2. Mild to moderate facet arthropathy at L4-L5.  3. No significant spinal canal or neural foraminal stenosis.    Assessment:   Patient suffers from a change in symptoms with bilateral radicular component that appears to be in the S1 distribution bilaterally. Fevers and chills recently are concerned even though her temperature is at 98.3 today. Patient rates try also present is concerning for disc displacement. I cannot rule out the possibility of infection or abscess. Lumbar segmental dysfunction.     1. Chronic bilateral low back pain with bilateral sciatica        Complicating Factors/Co-morbidities:   Previous low back problems. Obesity.    Plan:  Patient was evaluated and treatment with light axial distraction and mild Barber flexion distraction at the levels of L4 and L5 with fair tolerance.   I have talked to this patient's primary care physician and we are in agreement that she should go to the hospital and she will be driven to [facility] by her  for further evaluation and treatment recommendations.   Differential recommendation for a stat MRI was given but we have deferred to emergency room evaluation first.    Therapeutic Exercise/Rehab/Modalities performed today:    None.     Patient Instruction/Education:   Cryotherapy 15 minutes per hour as needed. Abdominal bracing and hip hinging exercises. Patient stated understanding of, and was in agreement with, the discussed instructions.    Goals of Care: Goal of care is to improve muscular and skeletal function and provide symptom relief.      Other treatment options discussed with patient is see her primary care provider.    Patient rates her pain post treatment at 8/10 with 10 being worst.     Patient is to return in one week for continued care and treatment of her condition consistent with plan of care.    Length of Visit: 20 min.                Implemented All Universal Safety Interventions:  [place] to call system. Call bell, personal items and telephone within reach. Instruct patient to call for assistance. Room bathroom lighting operational. Non-slip footwear when patient is off stretcher. Physically safe environment: no spills, clutter or unnecessary equipment. Stretcher in lowest position, wheels locked, appropriate side rails in place.
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
## 2022-07-31 VITALS
TEMPERATURE: 99 F | DIASTOLIC BLOOD PRESSURE: 98 MMHG | RESPIRATION RATE: 16 BRPM | HEART RATE: 60 BPM | OXYGEN SATURATION: 100 % | SYSTOLIC BLOOD PRESSURE: 141 MMHG

## 2022-07-31 PROCEDURE — 72125 CT NECK SPINE W/O DYE: CPT | Mod: 26,MA

## 2022-07-31 RX ORDER — ACETAMINOPHEN 500 MG
2 TABLET ORAL
Qty: 30 | Refills: 0
Start: 2022-07-31 | End: 2022-08-04

## 2022-07-31 RX ORDER — DIAZEPAM 5 MG
1 TABLET ORAL
Qty: 6 | Refills: 0
Start: 2022-07-31 | End: 2022-08-01

## 2022-07-31 RX ORDER — IBUPROFEN 200 MG
1 TABLET ORAL
Qty: 28 | Refills: 0
Start: 2022-07-31 | End: 2022-08-06

## 2022-07-31 RX ORDER — DIAZEPAM 5 MG
2 TABLET ORAL ONCE
Refills: 0 | Status: DISCONTINUED | OUTPATIENT
Start: 2022-07-31 | End: 2022-07-31

## 2022-07-31 RX ADMIN — Medication 2 MILLIGRAM(S): at 00:20

## 2023-02-13 NOTE — H&P PST ADULT - NSCAGESTDRUGCUTDN_GEN_A_CORE_SD
[FreeTextEntry8] : 45 y.o. female, PMHx hypertension, JAKOB, anxiety, palpitations, vertigo, obesity. \par Seen by PCP last week. +rapid strep. \par PCN allergy, Rx sent in for clinda.  \par Pharyngitis finally started to feel better 2 days after starting antibiotic (Saturday).  Has been eating and drinking since then.  However, only soft foods d/t left sided jaw pain.  Not able to fully open her jaw d/t pain and feels locked.  \par Feels overall still lousy - achy/crampy, fatigued, blurred vision, jaw pain (as above),  No longer with chills.  No n/v/d.  \par Did not have much of anything to eat or drink for about 4 days d/t throat pain and inability to swallow.  \par  no

## 2023-03-29 NOTE — ED PROVIDER NOTE - MDM ORDERS SUBMITTED SELECTION
Routing refill request to provider for review/approval because:  Drug interaction warning       Labs/EKG/Imaging Studies

## 2023-12-20 NOTE — ED PROVIDER NOTE - NS ED MD DISPO DISCHARGE CCDA
Prescription approved per [facility] Refill Protocol.  Josefa Carmen, RN  [facility] Triage Nurse   Patient/Caregiver provided printed discharge information.

## 2024-02-13 ENCOUNTER — EMERGENCY (EMERGENCY)
Facility: HOSPITAL | Age: 39
LOS: 1 days | Discharge: HOME | End: 2024-02-13
Attending: EMERGENCY MEDICINE | Admitting: EMERGENCY MEDICINE
Payer: MEDICAID

## 2024-02-13 VITALS
SYSTOLIC BLOOD PRESSURE: 165 MMHG | DIASTOLIC BLOOD PRESSURE: 113 MMHG | HEART RATE: 82 BPM | TEMPERATURE: 98 F | OXYGEN SATURATION: 99 % | RESPIRATION RATE: 16 BRPM

## 2024-02-13 DIAGNOSIS — Z98.890 OTHER SPECIFIED POSTPROCEDURAL STATES: Chronic | ICD-10-CM

## 2024-02-13 PROCEDURE — 71046 X-RAY EXAM CHEST 2 VIEWS: CPT | Mod: 26

## 2024-02-13 PROCEDURE — 99284 EMERGENCY DEPT VISIT MOD MDM: CPT

## 2024-02-13 PROCEDURE — 70450 CT HEAD/BRAIN W/O DYE: CPT | Mod: 26,MA

## 2024-02-13 RX ORDER — ACETAMINOPHEN 500 MG
975 TABLET ORAL ONCE
Refills: 0 | Status: COMPLETED | OUTPATIENT
Start: 2024-02-13 | End: 2024-02-13

## 2024-02-13 RX ORDER — IBUPROFEN 200 MG
600 TABLET ORAL ONCE
Refills: 0 | Status: DISCONTINUED | OUTPATIENT
Start: 2024-02-13 | End: 2024-02-13

## 2024-02-13 RX ORDER — KETOROLAC TROMETHAMINE 30 MG/ML
30 SYRINGE (ML) INJECTION ONCE
Refills: 0 | Status: DISCONTINUED | OUTPATIENT
Start: 2024-02-13 | End: 2024-02-13

## 2024-02-13 RX ADMIN — Medication 975 MILLIGRAM(S): at 17:45

## 2024-02-13 RX ADMIN — Medication 30 MILLIGRAM(S): at 17:45
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
## 2024-02-13 NOTE — ED PROVIDER NOTE - CARE PLAN
Subjective:       Patient ID: Flory Chacon is a 59 y.o. female     Chief Complaint:    Chief Complaint   Patient presents with    Follow-up        Allergies:  Meloxicam, Egg, Eggs [egg derived], Statins-hmg-coa reductase inhibitors, and Penicillins    Current Medications:    Outpatient Encounter Medications as of 4/26/2023   Medication Sig Dispense Refill    ABILIFY 2 mg Tab Take 2 mg by mouth once daily.      ALPRAZolam (XANAX) 1 MG tablet       amLODIPine (NORVASC) 10 MG tablet Take 1 tablet (10 mg total) by mouth once daily. 30 tablet 11    cetirizine (ZYRTEC) 10 MG tablet Take 1 tablet (10 mg total) by mouth once daily. 90 tablet 1    chlorthalidone (HYGROTEN) 25 MG Tab Take 25 mg by mouth every morning.      cyanocobalamin 1,000 mcg/mL injection cyanocobalamin (vit B-12) 1,000 mcg/mL injection solution   INJECT 1 ML ONCE DAILY INTRAMUSCULARLY FOR 7 DAYS AND THEN INJECT 1 ML ONCE A WEEK FOR 4 WEEKS AND THEN INJECT 1 ML ONCE EVERY MONTH      cycloSPORINE (RESTASIS) 0.05 % ophthalmic emulsion Restasis 0.05 % eye drops in a dropperette   INSTILL 1 DROP INTO EACH EYE EVERY 12 HOURS      DULoxetine (CYMBALTA) 60 MG capsule       esomeprazole (NEXIUM) 40 MG capsule 1 capsule.      estradioL (ESTRACE) 0.01 % (0.1 mg/gram) vaginal cream Place 2 g vaginally once daily. 60 g 11    ezetimibe (ZETIA) 10 mg tablet Take 1 tablet (10 mg total) by mouth every evening. 90 tablet 3    ferrous sulfate 220 mg (44 mg iron)/5 mL solution 7 ml drink with straw Orally daily for 30 days      fluticasone propionate (FLONASE) 50 mcg/actuation nasal spray fluticasone propionate 50 mcg/actuation nasal spray,suspension   USE 2 SPRAY(S) IN EACH NOSTRIL ONCE DAILY      loratadine (CLARITIN) 10 mg tablet Take 10 mg by mouth.      metoprolol succinate (TOPROL-XL) 100 MG 24 hr tablet Take 1 tablet (100 mg total) by mouth once daily. 30 tablet 11    montelukast (SINGULAIR) 10 mg tablet Take 10 mg by mouth once daily.      ondansetron  (ZOFRAN) 4 MG tablet ondansetron HCl 4 mg tablet   TAKE 1 TABLET BY MOUTH AS NEEDED FOR NAUSEA      potassium citrate (UROCIT-K) 10 mEq (1,080 mg) TbSR Urocit-K 10 10 mEq (1,080 mg) tablet,extended release   Take 1 tablet every day by oral route for 30 days.      sacubitriL-valsartan (ENTRESTO) 24-26 mg per tablet Take 1 tablet by mouth 2 (two) times daily.      sodium chloride 1 gram tablet Take 2 tablets (2 g total) by mouth 3 (three) times daily. 540 tablet 3    zolpidem (AMBIEN) 10 mg Tab Take 10 mg by mouth nightly as needed.      aspirin 81 MG Chew Take 1 tablet (81 mg total) by mouth once daily. 30 tablet 0    atogepant (QULIPTA) 60 mg Tab Take 60 mg by mouth once daily. 30 tablet 11    [DISCONTINUED] atenoloL (TENORMIN) 100 MG tablet Take 100 mg by mouth once daily. for 90 days      [DISCONTINUED] cimetidine (TAGAMET ORAL) Take 200 mg by mouth once daily.      [DISCONTINUED] hydroCHLOROthiazide (MICROZIDE) 12.5 mg capsule hydrochlorothiazide 12.5 mg capsule   TAKE 1 CAPSULE BY MOUTH ONCE DAILY IN THE MORNING FOR 90 DAYS      [DISCONTINUED] hydrocortisone 2.5 % cream APPLY CREAM TO AFFECTED AREA TO AFFECTED AREA ONCE DAILY FOR 5 DAYS TO UPPER BILATERAL EXTREMITIES      [DISCONTINUED] propranoloL (INDERAL) 10 MG tablet        No facility-administered encounter medications on file as of 4/26/2023.       History of Present Illness  60 y/o female following in neurology for reported syncope, reporting memory impairment last visit    Was actually admitted to hospital in April 2022 with reported syncope, there is EEG noted done in April of 2022 which was negative.  MRI brain at that time did not indicate acute abnormalities, nor did carotid doppler report stenosis.  Her monitoring at that time was concerning for SVT, she was following with cardiology at [facility] at time of her initial visit with us.  She ended up having pacemaker placed due to the SVT and cardiology felt that this was the cause of the syncope  events.  She denied any further complications since that surgery.    She declined the in home VEEG at time of the initial visit.    Recent primary care notes indicate she has recently been admitted and treated for hyponatremia, reported speech difficulty, but was improving.  I see the admit 1/30/23 for sodium level of 106.  She has been referred to nephrology as well.  She is prescribed abilify, cymbalta, xanax, and ambien, which too can very well be contributing to reported memory complications, likely the biggest ones.  She had CT head 2/3/23 which was negative for acute abnormalities.  She reports had prior sleep study done and was negative, but she was not able to give me date or where it was done.  Doing MMSE here in clinic initially was 27/30.  I did obtain labs including UDS, which showed marijuana, which one of the biggest complications of its use is chronic memory impairment.    She does have migraines, but topamax is not option due to memory impairment and already on metoprolol which we use for migraines.  Elavil is not option as she is already on anti-depressant treatment.  I can try her with Qulipta.         Review of Systems  Review of Systems   Constitutional:  Negative for diaphoresis and fever.   HENT:  Negative for congestion, hearing loss and tinnitus.    Eyes:  Negative for blurred vision, double vision, photophobia, discharge and redness.   Respiratory:  Negative for cough and shortness of breath.    Cardiovascular:  Negative for chest pain.   Gastrointestinal:  Negative for abdominal pain, nausea and vomiting.   Musculoskeletal:  Negative for back pain, joint pain, myalgias and neck pain.   Skin:  Negative for itching and rash.   Neurological:  Positive for loss of consciousness. Negative for dizziness, tremors, sensory change, speech change, focal weakness, seizures, weakness and headaches.   Psychiatric/Behavioral:  Positive for memory loss. Negative for depression and hallucinations. The  patient does not have insomnia.    All other systems reviewed and are negative.   Objective:     NEUROLOGICAL EXAMINATION:     MENTAL STATUS   Oriented to person, place, and time.   Registration: recalls 3 of 3 objects. Recall at 5 minutes: recalls 3 of 3 objects.   Attention: normal. Concentration: normal.   Speech: speech is normal   Level of consciousness: alert  Knowledge: good and consistent with education.   Normal comprehension.     CRANIAL NERVES     CN II   Visual fields full to confrontation.   Visual acuity: normal  Right visual field deficit: none  Left visual field deficit: none     CN III, IV, VI   Pupils are equal, round, and reactive to light.  Extraocular motions are normal.   Right pupil: Size: 3 mm. Shape: regular. Reactivity: brisk. Consensual response: intact. Accommodation: intact.   Left pupil: Size: 3 mm. Shape: regular. Reactivity: brisk. Consensual response: intact. Accommodation: intact.   CN III: no CN III palsy  CN VI: no CN VI palsy  Nystagmus: none   Diplopia: none  Upgaze: normal  Downgaze: normal  Conjugate gaze: present  Vestibulo-ocular reflex: present    CN V   Facial sensation intact.   Right facial sensation deficit: none  Left facial sensation deficit: none  Right corneal reflex: normal  Left corneal reflex: normal    CN VII   Facial expression full, symmetric.   Right facial weakness: none  Left facial weakness: none  Right taste: normal  Left taste: normal    CN VIII   CN VIII normal.   Hearing: intact    CN IX, X   CN IX normal.   CN X normal.   Palate: symmetric    CN XI   CN XI normal.   Right sternocleidomastoid strength: normal  Left sternocleidomastoid strength: normal  Right trapezius strength: normal  Left trapezius strength: normal    CN XII   CN XII normal.   Tongue: not atrophic  Fasciculations: absent  Tongue deviation: none    MOTOR EXAM   Muscle bulk: normal  Overall muscle tone: normal  Right arm tone: normal  Left arm tone: normal  Right arm pronator drift:  absent  Left arm pronator drift: absent  Right leg tone: normal  Left leg tone: normal    Strength   Right neck flexion: 5/5  Left neck flexion: 5/5  Right neck extension: 5/5  Left neck extension: 5/5  Right deltoid: 5/5  Left deltoid: 5/5  Right biceps: 5/5  Left biceps: 5/5  Right triceps: 5/  Left triceps: /  Right wrist flexion:   Left wrist flexion:   Right wrist extension: 5  Left wrist extension:   Right interossei:   Left interossei:   Right iliopsoas:   Left iliopsoas:   Right quadriceps:   Left quadriceps:   Right hamstrin/5  Left hamstrin/5  Right anterior tibial:   Left anterior tibial:   Right posterior tibial:   Left posterior tibial:   Right gastroc:   Left gastroc:     REFLEXES     Reflexes   Right brachioradialis: 2+  Left brachioradialis: 2+  Right biceps: 2+  Left biceps: 2+  Right triceps: 2+  Left triceps: 2+  Right patellar: 2+  Left patellar: 2+  Right achilles: 2+  Left achilles: 2+  Right plantar: normal  Left plantar: normal  Right Otero: absent  Left Otero: absent  Right ankle clonus: absent  Left ankle clonus: absent  Right pendular knee jerk: absent  Left pendular knee jerk: absent    SENSORY EXAM   Light touch normal.   Right arm light touch: normal  Left arm light touch: normal  Right leg light touch: normal  Left leg light touch: normal  Vibration normal.   Right arm vibration: normal  Left arm vibration: normal  Right leg vibration: normal  Left leg vibration: normal  Proprioception normal.   Right arm proprioception: normal  Left arm proprioception: normal  Right leg proprioception: normal  Left leg proprioception: normal  Pinprick normal.   Right arm pinprick: normal  Left arm pinprick: normal  Right leg pinprick: normal  Left leg pinprick: normal  Graphesthesia: normal  Romberg: negative  Stereognosis: normal    GAIT AND COORDINATION     Gait  Gait: normal     Coordination   Finger to nose coordination: normal  Heel to  shin coordination: normal  Tandem walking coordination: normal    Tremor   Resting tremor: absent  Intention tremor: absent  Action tremor: absent     Physical Exam  Vitals and nursing note reviewed.   Constitutional:       Appearance: Normal appearance.   HENT:      Head: Normocephalic.   Eyes:      Extraocular Movements: Extraocular movements intact and EOM normal.      Pupils: Pupils are equal, round, and reactive to light.   Cardiovascular:      Rate and Rhythm: Normal rate and regular rhythm.   Pulmonary:      Effort: Pulmonary effort is normal.      Breath sounds: Normal breath sounds.   Musculoskeletal:         General: No swelling or tenderness. Normal range of motion.      Cervical back: Normal range of motion and neck supple.      Right lower leg: No edema.      Left lower leg: No edema.   Skin:     General: Skin is warm and dry.      Coloration: Skin is not jaundiced.      Findings: No rash.   Neurological:      General: No focal deficit present.      Mental Status: She is alert and oriented to person, place, and time.      GCS: GCS eye subscore is 4. GCS verbal subscore is 5. GCS motor subscore is 6.      Cranial Nerves: No cranial nerve deficit.      Sensory: No sensory deficit.      Motor: Motor function is intact. No weakness.      Coordination: Coordination is intact. Coordination normal. Finger-Nose-Finger Test, Heel to Shin Test and Romberg Test normal.      Gait: Gait is intact. Gait and tandem walk normal.      Deep Tendon Reflexes: Reflexes normal.      Reflex Scores:       Tricep reflexes are 2+ on the right side and 2+ on the left side.       Bicep reflexes are 2+ on the right side and 2+ on the left side.       Brachioradialis reflexes are 2+ on the right side and 2+ on the left side.       Patellar reflexes are 2+ on the right side and 2+ on the left side.       Achilles reflexes are 2+ on the right side and 2+ on the left side.  Psychiatric:         Mood and Affect: Mood normal.          Speech: Speech normal.         Behavior: Behavior normal.        Assessment:     Problem List Items Addressed This Visit          Neuro    Hx of migraine headaches    Relevant Medications    atogepant (QULIPTA) 60 mg Tab    Mild neurocognitive disorder - Primary            Primary Diagnosis and ICD10  Mild neurocognitive disorder [G31.84]    Plan:     Patient Instructions   Continue followup with cardiology  Notify clinic if any further syncope events  Reviewed her recent labs and recommend avoid use of marijuana  MMSE in clinic 27/30  Try Qulipta 60mg daily    There are no discontinued medications.    Requested Prescriptions     Signed Prescriptions Disp Refills    atogepant (QULIPTA) 60 mg Tab 30 tablet 11     Sig: Take 60 mg by mouth once daily.       No orders of the defined types were placed in this encounter.           Principal Discharge DX:	Subconjunctival hemorrhage, traumatic   1 Principal Discharge DX:	Head trauma  Secondary Diagnosis:	Subconjunctival hemorrhage of right eye
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
## 2024-02-13 NOTE — ED PROVIDER NOTE - PATIENT PORTAL LINK FT
You can access the FollowMyHealth Patient Portal offered by [facility] by registering at the following website: [email or web address]. By joining [facility]’s FollowMyHealth portal, you will also be able to view your health information using other applications (apps) compatible with our system.

## 2024-02-13 NOTE — ED PROVIDER NOTE - NSICDXPASTMEDICALHX_GEN_ALL_CORE_FT
PAST MEDICAL HISTORY:  H/O tonsillitis and Adenoiditis  hospitalized at [facility]  for 2 days April 2021    Marijuana smoker sober for 51 days    MRSA infection right upper extremity
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
## 2024-02-13 NOTE — ED PROVIDER NOTE - SECONDARY DIAGNOSIS.
Patient presented to clinic today for lab work for follow up scheduled for 10/19/21 with Dr. Nguyen. Patient presents to clinic today with a disk from [facility] in [place] where she had a CT C/A/P with contrast. Disk brought to radiology for scanning and order placed for internal radiologist to review imaging.   No Subconjunctival hemorrhage of right eye

## 2024-02-13 NOTE — ED ADULT NURSE NOTE - NSICDXPASTMEDICALHX_GEN_ALL_CORE_FT
PAST MEDICAL HISTORY:  H/O tonsillitis and Adenoiditis  hospitalized at [facility]  for 2 days April 2021    Marijuana smoker sober for 51 days    MRSA infection right upper extremity

## 2024-06-13 NOTE — ED ADULT TRIAGE NOTE - TEMPERATURE IN CELSIUS (DEGREES C)
37.1 Do You Have A Family History Of Psoriasis?: no Is This A New Presentation, Or A Follow-Up?: Follow Up Psoriasis

## 2024-06-20 NOTE — ED ADULT NURSE NOTE - CCCP TRG CHIEF CMPLNT
[facility]  Cardiology Note    Yury Levin  1949 75 y.o.       CC: AFib, follow up watchman placement  Jhoan Guevara MD        HPI:   Mr. Levin is a 75 y.o.  gentleman who has three-vessel coronary disease and is status post four-vessel bypass in January 2018.  He also has history of stage IV prostate cancer, chronic kidney disease.  He was recently admitted to the hospital for a fall possibly related to hypotension which in turn was related to either cystitis plus minus sepsis.  He was markedly dehydrated with his creatinine going up to almost 4 requiring hospitalization.    I saw him when he was admitted to the hospital with acute kidney injury hypotension dehydration.  I was asked to see him for bigeminy.  The bigeminy resolved on its own but he was found to have paroxysmal atrial fibrillation without symptoms.  I then placed him on amiodarone 200 twice daily.    Patient was having hematuria and therefore we did not start him on blood thinners.  His creatinine is improved from 4.7 to 2..  He is only taking aspirin daily.  The patient has been approved for watchman which will be placed in December 18.  Since then he has had a cystoscopy which has not shown any bladder lesions.  He has no hematuria    Patient seen in hospital 6/10-6/13/24 for low H/H  5.3 /16.7.  He had bright red blood in his urine.  The patient was taken off aspirin which she was taking after the watchman placement.  Since then the urine is cleared.  He got 2 transfusions.  His hemoglobin is 8.4 today           Past Medical History:   Diagnosis Date    CAD (coronary artery disease) 01/17/2018    NSTEMI - CABG x4 1/17/18    Cancer of prostate (HCC)     Chest pain 01/17/2018    CHF (congestive heart failure) (HCC)     Unstable angina pectoris (HCC)      Past Surgical History:   Procedure Laterality Date    COLONOSCOPY      CORONARY ARTERY BYPASS GRAFT  01/17/2018    cabgx4 ([facility])    CYSTOSCOPY Right 6/11/2024     throat, pain

## 2024-08-02 NOTE — ED ADULT NURSE NOTE - OBJECTIVE STATEMENT
Continued Stay Note  [facility]     Patient Name: Erick Luong  MRN: [id number]  Today's Date: 8/2/2024    Admit Date: 7/31/2024    Plan: SNF placement vs HH?   Discharge Plan       Row Name 08/02/24 1001       Plan    Plan Comments SW has received call from APS/CPS worker, Venessa Hinton, out of [facility] [phone].  Advising she had been assigned to patient's case and will be coming to the hospital.      Row Name 08/02/24 0942       Plan    Plan SNF placement vs HH?    Plan Comments SW followed up on CPS/APS report #[id number] this am and report was accepted.  SW has not heard from a  yet.  Patient has orders to move to medical floor.  Recommend PT/OT consults to determine need for SNF placement.  SW following to determine appropriate level of care upon discharge.                   Discharge Codes    No documentation.                       SUZIE Hernandez     received pt to bed c complaining of severe left flank and lower abdominal pain. uncooperative at this time. labs obtained and sent. fluids infusing. pt medicated for pain.

## 2024-12-23 ENCOUNTER — EMERGENCY (EMERGENCY)
Facility: HOSPITAL | Age: 39
LOS: 1 days | Discharge: HOME | End: 2024-12-23
Attending: EMERGENCY MEDICINE | Admitting: EMERGENCY MEDICINE
Payer: MEDICAID

## 2024-12-23 VITALS
SYSTOLIC BLOOD PRESSURE: 181 MMHG | DIASTOLIC BLOOD PRESSURE: 122 MMHG | RESPIRATION RATE: 18 BRPM | OXYGEN SATURATION: 98 % | HEART RATE: 64 BPM

## 2024-12-23 VITALS
TEMPERATURE: 98 F | RESPIRATION RATE: 18 BRPM | OXYGEN SATURATION: 97 % | WEIGHT: 160.06 LBS | HEART RATE: 71 BPM | DIASTOLIC BLOOD PRESSURE: 129 MMHG | SYSTOLIC BLOOD PRESSURE: 182 MMHG

## 2024-12-23 DIAGNOSIS — Z98.890 OTHER SPECIFIED POSTPROCEDURAL STATES: Chronic | ICD-10-CM

## 2024-12-23 LAB
ALBUMIN SERPL ELPH-MCNC: 4 G/DL — SIGNIFICANT CHANGE UP (ref 3.3–5)
ALP SERPL-CCNC: 59 U/L — SIGNIFICANT CHANGE UP (ref 40–120)
ALT FLD-CCNC: 15 U/L — SIGNIFICANT CHANGE UP (ref 4–41)
ANION GAP SERPL CALC-SCNC: 9 MMOL/L — SIGNIFICANT CHANGE UP (ref 7–14)
AST SERPL-CCNC: 16 U/L — SIGNIFICANT CHANGE UP (ref 4–40)
BASOPHILS # BLD AUTO: 0.02 K/UL — SIGNIFICANT CHANGE UP (ref 0–0.2)
BASOPHILS NFR BLD AUTO: 0.4 % — SIGNIFICANT CHANGE UP (ref 0–2)
BILIRUB SERPL-MCNC: 0.2 MG/DL — SIGNIFICANT CHANGE UP (ref 0.2–1.2)
BUN SERPL-MCNC: 18 MG/DL — SIGNIFICANT CHANGE UP (ref 7–23)
CALCIUM SERPL-MCNC: 8.9 MG/DL — SIGNIFICANT CHANGE UP (ref 8.4–10.5)
CHLORIDE SERPL-SCNC: 107 MMOL/L — SIGNIFICANT CHANGE UP (ref 98–107)
CO2 SERPL-SCNC: 25 MMOL/L — SIGNIFICANT CHANGE UP (ref 22–31)
CREAT SERPL-MCNC: 0.89 MG/DL — SIGNIFICANT CHANGE UP (ref 0.5–1.3)
EGFR: 112 ML/MIN/1.73M2 — SIGNIFICANT CHANGE UP
EOSINOPHIL # BLD AUTO: 0 K/UL — SIGNIFICANT CHANGE UP (ref 0–0.5)
EOSINOPHIL NFR BLD AUTO: 0 % — SIGNIFICANT CHANGE UP (ref 0–6)
GLUCOSE SERPL-MCNC: 96 MG/DL — SIGNIFICANT CHANGE UP (ref 70–99)
HCT VFR BLD CALC: 42.3 % — SIGNIFICANT CHANGE UP (ref 39–50)
HGB BLD-MCNC: 14.5 G/DL — SIGNIFICANT CHANGE UP (ref 13–17)
IANC: 2.66 K/UL — SIGNIFICANT CHANGE UP (ref 1.8–7.4)
IMM GRANULOCYTES NFR BLD AUTO: 0.2 % — SIGNIFICANT CHANGE UP (ref 0–0.9)
LYMPHOCYTES # BLD AUTO: 1.4 K/UL — SIGNIFICANT CHANGE UP (ref 1–3.3)
LYMPHOCYTES # BLD AUTO: 30.9 % — SIGNIFICANT CHANGE UP (ref 13–44)
MCHC RBC-ENTMCNC: 27.9 PG — SIGNIFICANT CHANGE UP (ref 27–34)
MCHC RBC-ENTMCNC: 34.3 G/DL — SIGNIFICANT CHANGE UP (ref 32–36)
MCV RBC AUTO: 81.5 FL — SIGNIFICANT CHANGE UP (ref 80–100)
MONOCYTES # BLD AUTO: 0.44 K/UL — SIGNIFICANT CHANGE UP (ref 0–0.9)
MONOCYTES NFR BLD AUTO: 9.7 % — SIGNIFICANT CHANGE UP (ref 2–14)
NEUTROPHILS # BLD AUTO: 2.66 K/UL — SIGNIFICANT CHANGE UP (ref 1.8–7.4)
NEUTROPHILS NFR BLD AUTO: 58.8 % — SIGNIFICANT CHANGE UP (ref 43–77)
NRBC # BLD: 0 /100 WBCS — SIGNIFICANT CHANGE UP (ref 0–0)
NRBC # FLD: 0 K/UL — SIGNIFICANT CHANGE UP (ref 0–0)
PLATELET # BLD AUTO: 281 K/UL — SIGNIFICANT CHANGE UP (ref 150–400)
POTASSIUM SERPL-MCNC: 4 MMOL/L — SIGNIFICANT CHANGE UP (ref 3.5–5.3)
POTASSIUM SERPL-SCNC: 4 MMOL/L — SIGNIFICANT CHANGE UP (ref 3.5–5.3)
PROT SERPL-MCNC: 6.7 G/DL — SIGNIFICANT CHANGE UP (ref 6–8.3)
RBC # BLD: 5.19 M/UL — SIGNIFICANT CHANGE UP (ref 4.2–5.8)
RBC # FLD: 13.7 % — SIGNIFICANT CHANGE UP (ref 10.3–14.5)
SODIUM SERPL-SCNC: 141 MMOL/L — SIGNIFICANT CHANGE UP (ref 135–145)
WBC # BLD: 4.53 K/UL — SIGNIFICANT CHANGE UP (ref 3.8–10.5)
WBC # FLD AUTO: 4.53 K/UL — SIGNIFICANT CHANGE UP (ref 3.8–10.5)

## 2024-12-23 PROCEDURE — 71101 X-RAY EXAM UNILAT RIBS/CHEST: CPT | Mod: 26,RT

## 2024-12-23 PROCEDURE — 99284 EMERGENCY DEPT VISIT MOD MDM: CPT

## 2024-12-23 PROCEDURE — 71046 X-RAY EXAM CHEST 2 VIEWS: CPT | Mod: 26,59

## 2024-12-23 RX ORDER — LIDOCAINE 40 MG/G
1 CREAM TOPICAL
Qty: 1 | Refills: 0
Start: 2024-12-23

## 2024-12-23 RX ORDER — CYCLOBENZAPRINE HCL 10 MG
1 TABLET ORAL
Qty: 20 | Refills: 0
Start: 2024-12-23

## 2024-12-23 RX ORDER — LIDOCAINE 40 MG/G
1 CREAM TOPICAL ONCE
Refills: 0 | Status: COMPLETED | OUTPATIENT
Start: 2024-12-23 | End: 2024-12-23

## 2024-12-23 RX ORDER — ACETAMINOPHEN 500MG 500 MG/1
2 TABLET, COATED ORAL
Qty: 25 | Refills: 0
Start: 2024-12-23

## 2024-12-23 RX ORDER — KETOROLAC TROMETHAMINE 30 MG/ML
15 INJECTION INTRAMUSCULAR; INTRAVENOUS ONCE
Refills: 0 | Status: DISCONTINUED | OUTPATIENT
Start: 2024-12-23 | End: 2024-12-23

## 2024-12-23 RX ORDER — ACETAMINOPHEN 500MG 500 MG/1
1000 TABLET, COATED ORAL ONCE
Refills: 0 | Status: COMPLETED | OUTPATIENT
Start: 2024-12-23 | End: 2024-12-23

## 2024-12-23 RX ORDER — IBUPROFEN 200 MG
1 TABLET ORAL
Qty: 25 | Refills: 0
Start: 2024-12-23

## 2024-12-23 RX ORDER — CYCLOBENZAPRINE HCL 10 MG
10 TABLET ORAL ONCE
Refills: 0 | Status: COMPLETED | OUTPATIENT
Start: 2024-12-23 | End: 2024-12-23

## 2024-12-23 RX ADMIN — LIDOCAINE 1 PATCH: 40 CREAM TOPICAL at 18:07

## 2024-12-23 RX ADMIN — KETOROLAC TROMETHAMINE 15 MILLIGRAM(S): 30 INJECTION INTRAMUSCULAR; INTRAVENOUS at 14:58

## 2024-12-23 RX ADMIN — ACETAMINOPHEN 500MG 400 MILLIGRAM(S): 500 TABLET, COATED ORAL at 16:00

## 2024-12-23 RX ADMIN — Medication 10 MILLIGRAM(S): at 18:08

## 2024-12-23 NOTE — ED ADULT NURSE NOTE - NS ED NURSE LEVEL OF CONSCIOUSNESS ORIENTATION
PROCEDURE NOTE: Excision of Eyelid Lesion Right Lower Lid. Diagnosis: Cysts of Eyelids. Anesthesia: Topical. Prep: Betadine Scrub. Risks, benefits and alternatives discussed. Patient desires to proceed with excision of growth/lesion today. A drop of proparacaine was instilled in the involved eye. A tetracaine drop was used on a cotton tipped applicator and placed on the conjunctiva. The involved area was anesthetized using 1% lidocaine with epi. The lesion was excised using a 27 g needle and discarded. Erythromycin ophthalmic ointment was applied. Post op instructions were given to the patient. The patient tolerated the procedure well and left in good condition. The patient understands to call us for any concerns. Nahomy Terrell Oriented - self; Oriented - place; Oriented - time

## 2024-12-23 NOTE — ED PROVIDER NOTE - OBJECTIVE STATEMENT
Patient is a 39-year-old male who had a fall yesterday onto his back and right side.  Patient did not strike his head or lose consciousness.  Patient slipped on the ice.  Patient since that time has had excruciating pain in his right side especially when he moves or takes of breath.  Patient denies pain in the right arm.  Patient has been able to eat and does not have abdominal pain.  Patient does not feel air hunger but patient does have pain every time he takes of breath.  Patient on no blood thinners with no past medical history.

## 2024-12-23 NOTE — ED ADULT TRIAGE NOTE - AS PAIN REST
10 (severe pain) Hydroquinone Counseling:  Patient advised that medication may result in skin irritation, lightening (hypopigmentation), dryness, and burning.  In the event of skin irritation, the patient was advised to reduce the amount of the drug applied or use it less frequently.  Rarely, spots that are treated with hydroquinone can become darker (pseudoochronosis).  Should this occur, patient instructed to stop medication and call the office. The patient verbalized understanding of the proper use and possible adverse effects of hydroquinone.  All of the patient's questions and concerns were addressed.

## 2024-12-23 NOTE — ED PROVIDER NOTE - CLINICAL SUMMARY MEDICAL DECISION MAKING FREE TEXT BOX
Marquise: Patient is a 39-year-old who fell 2 days ago now has pain every time he takes of breath and has some phlegm in his throat.  Will get chest x-rays looking for pneumothorax or reactive pneumonia.  Patient not febrile here.  Will start to treat pain and we will double check creatinine for possible discharge.  Patient will definitely need an incentive spirometer

## 2024-12-23 NOTE — ED ADULT TRIAGE NOTE - NSWEIGHTCALCTOOLDRUG_GEN_A_CORE
used Double O-Z Plasty Text: The defect edges were debeveled with a #15 scalpel blade.  Given the location of the defect, shape of the defect and the proximity to free margins a Double O-Z plasty (double transposition flap) was deemed most appropriate.  Using a sterile surgical marker, the appropriate transposition flaps were drawn incorporating the defect and placing the expected incisions within the relaxed skin tension lines where possible. The area thus outlined was incised deep to adipose tissue with a #15 scalpel blade.  The skin margins were undermined to an appropriate distance in all directions utilizing iris scissors.  Hemostasis was achieved with electrocautery.  The flaps were then transposed into place, one clockwise and the other counterclockwise, and anchored with interrupted buried subcutaneous sutures.

## 2024-12-23 NOTE — ED ADULT NURSE REASSESSMENT NOTE - NS ED NURSE REASSESS COMMENT FT1
BREAK RN. Pt awake and alert, A&OX4, resp even and unlabored. Denies CP, SOB, HA, dizziness, palpitations, blurry vision. Resting comfortably. Awaiting XRAY results.

## 2024-12-23 NOTE — ED PROVIDER NOTE - NSFOLLOWUPINSTRUCTIONS_ED_ALL_ED_FT
Please follow up with your PMD within 1-2 weeks time.    Please focus on oral rehydration with water and electrolyte containing solutions (pedialyte, liquid IV, gatorade).     Take Ibuprofen 400 mg every 4-6 hours as needed for pain.     Take Acetaminophen 1000 mg every 8 hours as needed for pain.     Take Cyclobenzaprine 10 mg every 8 hours as needed for muscle spasm.     Return if your symptoms worsen.

## 2024-12-23 NOTE — ED ADULT NURSE NOTE - SUICIDE SCREENING QUESTION 1
Please print prescan out of Media Tab and then complete and sign.   Once form is completed and signed, please fax to [phone].    Please direct any questions to [phone], Option 1.   Thanks,  Forms Completion Team. 
No

## 2024-12-23 NOTE — ED ADULT NURSE NOTE - NSFALLRISKINTERV_ED_ALL_ED

## 2024-12-23 NOTE — ED ADULT NURSE REASSESSMENT NOTE - NS ED NURSE REASSESS COMMENT FT1
Report taking from break covering RN, pt AOX 3 feeling better from his pain, pt is D/C home.      Carlie Rodriguez RN

## 2024-12-23 NOTE — ED ADULT NURSE NOTE - OBJECTIVE STATEMENT
Er : s/p fall yest. pt stated he out, have some drinks, fell injured his right side ribs, denies head injuries, ambulatory to Er, pain increase with inspiration, no bruising noted,   Labs sent, IV to right AC 20g angio cath place,  medicated as ordered.   lungs clear, respiration equal 18-20b/min, CXRAY done, pending dispo.  Carlie Rodriguez RN

## 2024-12-23 NOTE — ED PROVIDER NOTE - PATIENT PORTAL LINK FT
You can access the FollowMyHealth Patient Portal offered by [facility] by registering at the following website: [email or web address]. By joining [facility]’s FollowMyHealth portal, you will also be able to view your health information using other applications (apps) compatible with our system.

## 2025-02-07 ENCOUNTER — INPATIENT (INPATIENT)
Facility: HOSPITAL | Age: 40
LOS: 3 days | Discharge: LEFT BEFORE DISCHARGE | End: 2025-02-11
Attending: INTERNAL MEDICINE | Admitting: INTERNAL MEDICINE
Payer: MEDICAID

## 2025-02-07 VITALS
TEMPERATURE: 98 F | RESPIRATION RATE: 16 BRPM | WEIGHT: 162.04 LBS | SYSTOLIC BLOOD PRESSURE: 176 MMHG | HEART RATE: 64 BPM | HEIGHT: 67 IN | DIASTOLIC BLOOD PRESSURE: 111 MMHG | OXYGEN SATURATION: 96 %

## 2025-02-07 DIAGNOSIS — Z98.890 OTHER SPECIFIED POSTPROCEDURAL STATES: Chronic | ICD-10-CM

## 2025-02-07 DIAGNOSIS — R07.9 CHEST PAIN, UNSPECIFIED: ICD-10-CM

## 2025-02-07 DIAGNOSIS — I10 ESSENTIAL (PRIMARY) HYPERTENSION: ICD-10-CM

## 2025-02-07 DIAGNOSIS — Z29.9 ENCOUNTER FOR PROPHYLACTIC MEASURES, UNSPECIFIED: ICD-10-CM

## 2025-02-07 LAB
ALBUMIN SERPL ELPH-MCNC: 4.4 G/DL — SIGNIFICANT CHANGE UP (ref 3.3–5)
ALP SERPL-CCNC: 59 U/L — SIGNIFICANT CHANGE UP (ref 40–120)
ALT FLD-CCNC: 15 U/L — SIGNIFICANT CHANGE UP (ref 4–41)
ANION GAP SERPL CALC-SCNC: 11 MMOL/L — SIGNIFICANT CHANGE UP (ref 7–14)
AST SERPL-CCNC: 16 U/L — SIGNIFICANT CHANGE UP (ref 4–40)
BASOPHILS # BLD AUTO: 0.03 K/UL — SIGNIFICANT CHANGE UP (ref 0–0.2)
BASOPHILS NFR BLD AUTO: 0.7 % — SIGNIFICANT CHANGE UP (ref 0–2)
BILIRUB SERPL-MCNC: 0.5 MG/DL — SIGNIFICANT CHANGE UP (ref 0.2–1.2)
BUN SERPL-MCNC: 18 MG/DL — SIGNIFICANT CHANGE UP (ref 7–23)
CALCIUM SERPL-MCNC: 9.2 MG/DL — SIGNIFICANT CHANGE UP (ref 8.4–10.5)
CHLORIDE SERPL-SCNC: 107 MMOL/L — SIGNIFICANT CHANGE UP (ref 98–107)
CK MB BLD-MCNC: 1.5 % — SIGNIFICANT CHANGE UP (ref 0–2.5)
CK MB CFR SERPL CALC: 1.2 NG/ML — SIGNIFICANT CHANGE UP
CK SERPL-CCNC: 80 U/L — SIGNIFICANT CHANGE UP (ref 30–200)
CO2 SERPL-SCNC: 23 MMOL/L — SIGNIFICANT CHANGE UP (ref 22–31)
CREAT SERPL-MCNC: 0.86 MG/DL — SIGNIFICANT CHANGE UP (ref 0.5–1.3)
EGFR: 113 ML/MIN/1.73M2 — SIGNIFICANT CHANGE UP
EOSINOPHIL # BLD AUTO: 0.02 K/UL — SIGNIFICANT CHANGE UP (ref 0–0.5)
EOSINOPHIL NFR BLD AUTO: 0.5 % — SIGNIFICANT CHANGE UP (ref 0–6)
GLUCOSE SERPL-MCNC: 96 MG/DL — SIGNIFICANT CHANGE UP (ref 70–99)
HCT VFR BLD CALC: 46.9 % — SIGNIFICANT CHANGE UP (ref 39–50)
HGB BLD-MCNC: 15.4 G/DL — SIGNIFICANT CHANGE UP (ref 13–17)
IANC: 1.9 K/UL — SIGNIFICANT CHANGE UP (ref 1.8–7.4)
IMM GRANULOCYTES NFR BLD AUTO: 0 % — SIGNIFICANT CHANGE UP (ref 0–0.9)
LYMPHOCYTES # BLD AUTO: 1.87 K/UL — SIGNIFICANT CHANGE UP (ref 1–3.3)
LYMPHOCYTES # BLD AUTO: 45.1 % — HIGH (ref 13–44)
MAGNESIUM SERPL-MCNC: 2.2 MG/DL — SIGNIFICANT CHANGE UP (ref 1.6–2.6)
MCHC RBC-ENTMCNC: 26.9 PG — LOW (ref 27–34)
MCHC RBC-ENTMCNC: 32.8 G/DL — SIGNIFICANT CHANGE UP (ref 32–36)
MCV RBC AUTO: 82 FL — SIGNIFICANT CHANGE UP (ref 80–100)
MONOCYTES # BLD AUTO: 0.33 K/UL — SIGNIFICANT CHANGE UP (ref 0–0.9)
MONOCYTES NFR BLD AUTO: 8 % — SIGNIFICANT CHANGE UP (ref 2–14)
NEUTROPHILS # BLD AUTO: 1.9 K/UL — SIGNIFICANT CHANGE UP (ref 1.8–7.4)
NEUTROPHILS NFR BLD AUTO: 45.7 % — SIGNIFICANT CHANGE UP (ref 43–77)
NRBC # BLD AUTO: 0 K/UL — SIGNIFICANT CHANGE UP (ref 0–0)
NRBC # BLD: 0 /100 WBCS — SIGNIFICANT CHANGE UP (ref 0–0)
NRBC # FLD: 0 K/UL — SIGNIFICANT CHANGE UP (ref 0–0)
NRBC BLD-RTO: 0 /100 WBCS — SIGNIFICANT CHANGE UP (ref 0–0)
NT-PROBNP SERPL-SCNC: 60 PG/ML — SIGNIFICANT CHANGE UP
PLATELET # BLD AUTO: 191 K/UL — SIGNIFICANT CHANGE UP (ref 150–400)
POTASSIUM SERPL-MCNC: 4.4 MMOL/L — SIGNIFICANT CHANGE UP (ref 3.5–5.3)
POTASSIUM SERPL-SCNC: 4.4 MMOL/L — SIGNIFICANT CHANGE UP (ref 3.5–5.3)
PROT SERPL-MCNC: 7.1 G/DL — SIGNIFICANT CHANGE UP (ref 6–8.3)
RBC # BLD: 5.72 M/UL — SIGNIFICANT CHANGE UP (ref 4.2–5.8)
RBC # FLD: 14.5 % — SIGNIFICANT CHANGE UP (ref 10.3–14.5)
SODIUM SERPL-SCNC: 141 MMOL/L — SIGNIFICANT CHANGE UP (ref 135–145)
TROPONIN T, HIGH SENSITIVITY RESULT: <6 NG/L — SIGNIFICANT CHANGE UP
WBC # BLD: 4.15 K/UL — SIGNIFICANT CHANGE UP (ref 3.8–10.5)
WBC # FLD AUTO: 4.15 K/UL — SIGNIFICANT CHANGE UP (ref 3.8–10.5)

## 2025-02-07 PROCEDURE — 71046 X-RAY EXAM CHEST 2 VIEWS: CPT | Mod: 26

## 2025-02-07 PROCEDURE — 93010 ELECTROCARDIOGRAM REPORT: CPT | Mod: 76

## 2025-02-07 PROCEDURE — 99223 1ST HOSP IP/OBS HIGH 75: CPT

## 2025-02-07 PROCEDURE — 99285 EMERGENCY DEPT VISIT HI MDM: CPT

## 2025-02-07 RX ORDER — ASPIRIN 81 MG/1
81 TABLET, COATED ORAL DAILY
Refills: 0 | Status: DISCONTINUED | OUTPATIENT
Start: 2025-02-08 | End: 2025-02-11

## 2025-02-07 RX ORDER — FAMOTIDINE 10 MG/ML
20 INJECTION INTRAVENOUS AT BEDTIME
Refills: 0 | Status: DISCONTINUED | OUTPATIENT
Start: 2025-02-07 | End: 2025-02-11

## 2025-02-07 RX ORDER — ACETAMINOPHEN 160 MG/5ML
650 SUSPENSION ORAL EVERY 6 HOURS
Refills: 0 | Status: DISCONTINUED | OUTPATIENT
Start: 2025-02-07 | End: 2025-02-11

## 2025-02-07 RX ADMIN — FAMOTIDINE 20 MILLIGRAM(S): 10 INJECTION INTRAVENOUS at 22:23

## 2025-02-07 NOTE — H&P ADULT - ASSESSMENT
39 -year-old male with recently diagnosed HTN (not yet on meds), recent epistaxis requiring a rhinorocket 2 weeks ago, presenting with intermittent chest pain that was reproducible on exam however with concerning EKG findings.

## 2025-02-07 NOTE — H&P ADULT - NSHPLABSRESULTS_GEN_ALL_CORE
15.4   4.15  )-----------( 191      ( 07 Feb 2025 15:53 )             46.9     141  |  107  |  18  ----------------------------<  96     02-07  4.4   |  23  |  0.86    Ca    9.2      07 Feb 2025 15:53  Mg     2.20     02-07    TPro  7.1  /  Alb  4.4  /  TBili  0.5  /  DBili  x   /  AST  16  /  ALT  15  /  AlkPhos  59  02-07    hs Troponin, T - <6 ng/L (02-07-25 @ 15:53)    Pro-BNP: 60 (02-07-25 @ 15:53)    Creatine Kinase: 80 U/L (02.07.25 @ 15:53)    CKMB Mass Assay (02.07.25 @ 15:53)    CKMB Units: 1.2 ng/mL    CPK Mass Assay %: 1.5 %    CXR personally reviewed and interpreted - No focal consolidations     EKG personally reviewed and interpreted - NSR 59bpm, 1mm BERNADETTE I, aVL, V2 with ST depression and TWI in III, aVF; QTc 401. Patient had TWI in III, aVF in past however more pronounced on current EKG. EKG from PCP essentially unchanged from EKG obtained in ED

## 2025-02-07 NOTE — ED PROVIDER NOTE - CLINICAL SUMMARY MEDICAL DECISION MAKING FREE TEXT BOX
Ariel: HTN w/ concerning EKG for ventricular aneurysm or infarction, but EKG stable over several hours and pt essentially Asx. Eval for ACS: check trop, EKG, CXR. Admit for Cards w/u.

## 2025-02-07 NOTE — PATIENT PROFILE ADULT - FALL HARM RISK - HARM RISK INTERVENTIONS
Communicate Risk of Fall with Harm to all staff/Monitor for mental status changes/Monitor gait and stability/Reinforce activity limits and safety measures with patient and family/Review medications for side effects contributing to fall risk/Sit up slowly, dangle for a short time, stand at bedside before walking/Tailored Fall Risk Interventions/Toileting schedule using arm’s reach rule for commode and bathroom/Visual Cue: Yellow wristband and red socks/Bed in lowest position, wheels locked, appropriate side rails in place/Call bell, personal items and telephone in reach/Instruct patient to call for assistance before getting out of bed or chair/Non-slip footwear when patient is out of bed/[place] to call system/Physically safe environment - no spills, clutter or unnecessary equipment/Purposeful Proactive Rounding/Room/bathroom lighting operational, light cord in reach

## 2025-02-07 NOTE — H&P ADULT - NSHPSOCIALHISTORY_GEN_ALL_CORE
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

## 2025-02-07 NOTE — H&P ADULT - NSICDXPASTMEDICALHX_GEN_ALL_CORE_FT
PAST MEDICAL HISTORY:  Epistaxis     H/O tonsillitis and Adenoiditis  hospitalized at [facility]  for 2 days April 2021    Marijuana smoker sober for 51 days    MRSA infection right upper extremity

## 2025-02-07 NOTE — PATIENT PROFILE ADULT - FALL HARM RISK - FACTORS NURSING JUDGEMENT
Critical Care Progress Note    Date of admission  11/18/2018    Chief Complaint  25 y.o. female admitted 11/18/2018 with drug overdose.    Hospital Course    This lady was admitted to the ICU intubated after an acute drug overdose in a suicide attempt.    Interval Problem Update  Reviewed last 24 hour events:      SUDHEER Becerra  Will follow in UE  Moves all 4 spont  ST  NG to LCS  LR 20 KCl at 100  Acetate 150  Vent 1  Replete K  Stop acetate      Review of Systems  Review of Systems   Unable to perform ROS: Acuity of condition        Vital Signs for last 24 hours   Temp:  [35.7 °C (96.3 °F)] 35.7 °C (96.3 °F)  Pulse:  [] 98  Resp:  [14-19] 19  BP: (113)/(55) 113/55    Hemodynamic parameters for last 24 hours       Respiratory  Key Vent Mode: APVCMV  Rate (breaths/min): 14  Vt Target (mL): 360  PEEP/CPAP: 8  FiO2: 40    Physical Exam   Physical Exam   Constitutional:   Sedated on ventilator   HENT:   Head: Normocephalic.   Right Ear: External ear normal.   Left Ear: External ear normal.   Nose: Nose normal.   Mouth/Throat: Oropharynx is clear and moist.   Eyes: Pupils are equal, round, and reactive to light. Right eye exhibits no discharge. Left eye exhibits no discharge. No scleral icterus.   Neck: Neck supple. No JVD present. No tracheal deviation present.   Cardiovascular: Intact distal pulses.  Exam reveals no gallop and no friction rub.    No murmur heard.  Sinus rhythm   Pulmonary/Chest: She has no wheezes. She has rales (Coarse crackles bilaterally).   Abdominal: Soft. Bowel sounds are normal. She exhibits no distension. There is no tenderness. There is no rebound.   Musculoskeletal: She exhibits no tenderness or deformity.   No clubbing or cyanosis   Neurological:   Sedated.  Will arouse and becomes very agitated when sedation is weaned.  Moves all 4 extremities.  Nods.   Skin: Skin is warm and dry. No rash noted. She is not diaphoretic.       Medications  Current Facility-Administered Medications    Medication Dose Route Frequency Provider Last Rate Last Dose   • Respiratory Care per Protocol   Nebulization Continuous RT Alexis Thompson M.D.       • senna-docusate (PERICOLACE or SENOKOT S) 8.6-50 MG per tablet 2 Tab  2 Tab Oral BID Alexis Thompson M.D.        And   • polyethylene glycol/lytes (MIRALAX) PACKET 1 Packet  1 Packet Oral QDAY PRN Alexis Thompson M.D.        And   • magnesium hydroxide (MILK OF MAGNESIA) suspension 30 mL  30 mL Oral QDAY PRN Alexis Thompson M.D.        And   • bisacodyl (DULCOLAX) suppository 10 mg  10 mg Rectal QDAY PRN Alexis Thompson M.D.       • MD Alert...ICU Electrolyte Replacement per Pharmacy   Other pharmacy to dose Alexis Thompson M.D.       • heparin injection 5,000 Units  5,000 Units Subcutaneous Q8HRS Alexis Thompson M.D.       • propofol (DIPRIVAN) injection  0-80 mcg/kg/min Intravenous Continuous Alexis Thompson M.D. 16.2 mL/hr at 11/18/18 0400 45 mcg/kg/min at 11/18/18 0400   • thiamine (B-1) 100 mg in D5W 100 mL IVPB  100 mg Intravenous DAILY Alexis Thompson M.D.       • folic acid 1 mg in NS 50 mL IVPB  1 mg Intravenous Q24HRS Alexis Thompson M.D.   Stopped at 11/18/18 0345   • midazolam (VERSED) 2 MG/2ML injection 1-4 mg  1-4 mg Intravenous Q HOUR PRN Alexis Thompson M.D.   4 mg at 11/18/18 0402   • magnesium sulfate IVPB premix 2 g  2 g Intravenous Once Alexis Thompson M.D.       • potassium chloride 20 mEq in LR 1,000 mL infusion   Intravenous Continuous Alexis Thompson M.D.       • ziprasidone (GEODON) injection 10 mg  10 mg Intramuscular Q4HRS PRN Alexis Thompson M.D.           Fluids  No intake or output data in the 24 hours ending 11/18/18 0436    Laboratory          No results for input(s): SODIUM, POTASSIUM, CHLORIDE, CO2, BUN, CREATININE, MAGNESIUM, PHOSPHORUS, CALCIUM in the last 72 hours.  No results for input(s): ALTSGPT, ASTSGOT, ALKPHOSPHAT, TBILIRUBIN, DBILIRUBIN, GAMMAGT, AMYLASE, LIPASE, ALB, PREALBUMIN, GLUCOSE in the  last 72 hours.      No results for input(s): RBC, HEMOGLOBIN, HEMATOCRIT, PLATELETCT, PROTHROMBTM, APTT, INR, IRON, FERRITIN, TOTIRONBC in the last 72 hours.    Imaging  X-Ray:  I have personally reviewed the images and compared with prior images. and My impression is: Clear lungs    Assessment/Plan  * Acute hypoxemic respiratory failure (HCC)- (present on admission)   Assessment & Plan    Intubated 11/18 for airway protection  All appropriate ventilator bundles have been initiated  SBT as tolerated     Drug overdose, intentional self-harm, initial encounter (HCC)- (present on admission)   Assessment & Plan    Intentional Seroquel overdose  Mental status is improved     Toxic encephalopathy- (present on admission)   Assessment & Plan    Due to acute Seroquel overdose     Hypokalemia- (present on admission)   Assessment & Plan    Replete potassium     Fever- (present on admission)   Assessment & Plan    Suspect possible aspiration  Culture sputum  Start Unasyn, 3 g IV every 6 hours and plan 5 days of therapy     Schizoaffective disorder (HCC)- (present on admission)   Assessment & Plan    History of schizoaffective disorder, PTSD and multiple prior suicide attempts          VTE:  Lovenox  Ulcer: H2 Antagonist  Lines: Mckenzie Catheter  Ongoing indication addressed    I have performed a physical exam and reviewed and updated ROS and Plan today (11/18/2018). In review of yesterday's note (11/17/2018), there are no changes except as documented above.    I have assessed and reassessed her respiratory status with spontaneous breathing trials and ventilator adjustments, ventilator waveforms, airway mechanics, blood pressure, hemodynamics, cardiovascular status and neurologic status with titration of propofol.  She is at increased risk for worsening respiratory, cardiovascular and CNS system dysfunction.    Discussed patient condition and risk of morbidity and/or mortality with Hospitalist, RN, RT, Pharmacy, Charge nurse / hot  rounds and QA team     The patient remains critically ill.  Critical care time = 60 minutes in directly providing and coordinating critical care and extensive data review.  No time overlap and excludes procedures.  The time spent is in addition to the time spent by Dr. Thompson earlier today.    Vicente Murphy MD  Pulmonary and Critical Care Medicine     Yes

## 2025-02-07 NOTE — ED ADULT NURSE REASSESSMENT NOTE - NS ED NURSE REASSESS COMMENT FT1
Pt awake and alert, A&OX4, resp even and unlabored. Pt in no acute distress, pt NSR on CM. Denies CP, SOB, HA, dizziness, palpitations, blurry vision. Resting comfortably. Safety being maintained, Awaiting bed upstairs.
BREAK RN: Pt received in hallway spot 12A in no acute distress. Denies headache, dizziness, nausea, vomiting, abd pain, SOB. RR even and unlabored. Noted to be sinus rhythm on tele. Pt safety maintained. Pending transport to bed assignment.

## 2025-02-07 NOTE — ED ADULT TRIAGE NOTE - CHIEF COMPLAINT QUOTE
Brought by EMS from  for c/o chest pain x 2 days. Pt had elevated BPs while at . Denies any Hx. Refused nitro and IV access.  mg given by EMS. Pt states was diagnosed with HTN 2 weeks and was following up today with .

## 2025-02-07 NOTE — H&P ADULT - SOCIAL HISTORY: ALCOHOL USE
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

## 2025-02-07 NOTE — ED ADULT NURSE NOTE - NSFALLUNIVINTERV_ED_ALL_ED
Bed/Stretcher in lowest position, wheels locked, appropriate side rails in place/Call bell, personal items and telephone in reach/Instruct patient to call for assistance before getting out of bed/chair/stretcher/Non-slip footwear applied when patient is off stretcher/[place] to call system/Physically safe environment - no spills, clutter or unnecessary equipment/Purposeful proactive rounding/Room/bathroom lighting operational, light cord in reach

## 2025-02-07 NOTE — H&P ADULT - HISTORY OF PRESENT ILLNESS
39 -year-old male with recently diagnosed HTN (not yet on meds) when he went to the hospital 2 weeks ago for epistaxis requiring a rhino rocket x24H, was not started on BP meds at that time as there was a concern for white coat hypertension so patient was told to f/u with his PCP. He went to his PCP earlier today (which is affiliated with [facility]) and had BP repeat and EKG. EKG was concerning and patient was given ASA 81mg x4 tablets and sent to the [facility] ED for further evaluation. Patient reports having L shoulder pain 3d ago that was radiating to the hand with associated paresthesias that resolved. He reports for the past 1 week he has had lower sternal burning pain that is intermittent, non-radiating, self resolving in minutes (<10min). He last experienced the pain earlier in the ED that was 6/10 in severity, improved with massage. Last night when he had the pain it was associated with shortness of breath however denies any nausea, diaphoresis or palpitations. He has a history of GERD however he states the pain does note feel exactly the same as the pain he had experienced in the past. He denies any worsening of the pain with food or exertion. He has never had a stress test or an echocardiogram. His mother has a history of HTN but denies any family history of MI/CAD. He reports recent anxiety regarding the diagnosis of HTN. Patient was prescribed amlodipine 10mg by PCP today however did not yet  the medication from pharmacy.    In the ED VS:  98.1  60-64  157-176/  16-18  96-98%RA (received ASA 324mg at 14:00 at his PCP office per patient report, denies any other medications being given).

## 2025-02-07 NOTE — H&P ADULT - PROBLEM SELECTOR PLAN 1
discussed EKG findings with Dr. Morris, if has recurrent chest pain will treat for unstable angina/ACS  s/p ASA 324mg PO at PCP earlier this afternoon, will c/w ASA 81mg QD for now  repeat troponin with coags in case of recurrence of chest pain and need to start on a/c  check TSH, A1c, FLP  check TTE  stress test vs angio per cards recs in AM  ddx includes MSK, anxiety, reflux, however given EKG findings cannot r/o cardiac etiology   monitor on tele   acetaminophen PRN pain (of note, has morphine allergy -> hives)  started on famotidine for possible reflux, first dose tonight  if chest pain returns would treat for NSTEMI/unstable angina discussed EKG findings with Dr. Morris, if has recurrent chest pain will treat for unstable angina/ACS  s/p ASA 324mg PO at PCP earlier this afternoon, will c/w ASA 81mg QD for now  repeat troponin with coags in case of recurrence of chest pain and need to start on a/c  check TSH, A1c, FLP  check TTE  stress test vs angio per cards recs in AM  ddx includes MSK, anxiety, reflux, however given EKG findings cannot r/o cardiac etiology   monitor on tele   acetaminophen PRN pain (of note, has morphine allergy -> hives)  started on famotidine for possible reflux, first dose tonight  if chest pain returns would treat for NSTEMI/unstable angina  encouraged patient to reduce alcohol use, monitor clinically for signs of withdrawal, SW consult

## 2025-02-07 NOTE — ED PROVIDER NOTE - OBJECTIVE STATEMENT
Ariel: Had nosebleed 2 wks ago. Found to have HTN. Went to [facility]. PCP Anna Sarah today. Dx'd w/ HTN. EKG there w/ BERNADETTE I and aVL. EMS was called; he refused NTG b/c no Sx of CP/SOB now. Wanted to come to [facility] instead of [facility]. For a few days, had been having epig pain and 10/10 L shoulder pain, but no longer having epig pain (last was last night, and (after he was told EMS was being called) very briefly today), and his L shoulder pain is much better (2/2). No SOB, dizziness, nausea. EKG is the same as the one at [facility]. No radiation to back.

## 2025-02-07 NOTE — ED PROVIDER NOTE - ATTENDING CONTRIBUTION TO CARE
I performed a face-to-face evaluation of the patient and performed a history and physical examination. I agree with the history and physical examination. If this was a PA visit, I personally saw the patient with the PA and performed a substantive portion of the visit including all aspects of the medical decision making.    HTN w/ concerning EKG for ventricular aneurysm or infarction, but EKG stable over several hours and pt essentially Asx. Eval for ACS: check trop, EKG, CXR. Admit for Cards w/u.

## 2025-02-07 NOTE — ED ADULT NURSE NOTE - OBJECTIVE STATEMENT
Pt received to rm  12a , awake and alert, A&OX4, ambulatory. C/o. chest pain for two weeks. Pt came by EMS,   mg given by EMS. Pt states was diagnosed with HTN 2 weeks and was following up today with UC.  Pt states he has had some chest pain that comes and goes when he is worked up or anxious. Pt was at doctor today when they told him to come here due to abnormal EKG. Respirations even and unlabored. Pt in no acute distress at this time. Pt abdomen is soft, nontender, and nondistended. Pulses equal b/l in all extremities.   DeniesSOB, N/V, HA, dizziness, palpitations, blurry vision. 20G IV placed to  R AC, labs drawn and sent.   . Bed in lowest position, call bell within reach. Safety maintained. Pt received to rm  12a , awake and alert, A&OX4, ambulatory. C/o. chest pain for two weeks. Pt came by EMS,   mg given by EMS. Pt states was diagnosed with HTN 2 weeks and was following up today with UC.  Pt states he has had some chest pain that comes and goes when he is worked up or anxious. Pt was at doctor today when they told him to come here due to abnormal EKG. Respirations even and unlabored. Pt in no acute distress at this time. Pt abdomen is soft, nontender, and nondistended. Pulses equal b/l in all extremities. Pt placed on CM.  Denies SOB, N/V, HA, dizziness, palpitations, blurry vision. 20G IV placed to  R AC, labs drawn and sent.   . Bed in lowest position, call bell within reach. Safety maintained.

## 2025-02-07 NOTE — H&P ADULT - PROBLEM SELECTOR PLAN 2
rate, regular rhythm, normal S1, S2, no murmurs, rubs, clicks or gallops  Abdomen - soft, nondistended, nontender. No rebound, rigidity or guarding. No masses or organomegaly noted. Bowel sounds are positive. 1. Influenza A     2. Viral syndrome  POCT Influenza A/B     I have reviewed the following diagnostic data: Rapid influenza test - A positive. Please see report for additional information. Orders Placed This Encounter   Procedures    POCT Influenza A/B       Orders Placed This Encounter   Medications    oseltamivir 6mg/ml (TAMIFLU) 6 MG/ML SUSR suspension     Sig: Take 10 mLs by mouth 2 times daily     Dispense:  100 mL     Refill:  0       She will be held out of school for the next 4 days. Father was instructed to give patient plenty of fluids. Take Tylenol or Advil for pain or fever. Follow-up if signs or symptoms persist or worsen otherwise prn. Rx'ed amlodipine 10mg by PCP earlier today, BP downtrending without intervention, reports recent anxiety, will hold off on restarting for now in case of need for BB Rx'ed amlodipine 10mg by PCP earlier today, BP downtrending without intervention, reports recent anxiety, will hold off on restarting for now in case of need for BB, however if starts uptrending would start the CCB

## 2025-02-07 NOTE — H&P ADULT - NSHPPHYSICALEXAM_GEN_ALL_CORE
Vital Signs Last 24 Hrs  T(C): 36.7 (07 Feb 2025 20:31), Max: 36.7 (07 Feb 2025 20:31)  T(F): 98.1 (07 Feb 2025 20:31), Max: 98.1 (07 Feb 2025 20:31)  HR: 62 (07 Feb 2025 20:31) (60 - 64)  BP: 157/90 (07 Feb 2025 20:31) (157/90 - 176/111)  RR: 18 (07 Feb 2025 20:31) (16 - 18)  SpO2: 98% (07 Feb 2025 20:31) (96% - 98%)    Parameters below as of 07 Feb 2025 20:31  Patient On (Oxygen Delivery Method): room air    PHYSICAL EXAM:  GENERAL: NAD  HEAD:  Atraumatic, Normocephalic  EYES: conjunctiva and sclera clear  NECK: Supple, No JVD  CHEST/LUNG: Clear to auscultation bilaterally; No wheezes, rales or rhonchi; normal work of breathing, speaking in full sentences  HEART: Regular rate and rhythm; No murmurs, rubs, or gallops, (+)S1, S2; chest pain reproducible with lower sternal TTP  ABDOMEN: Soft, Nontender, Nondistended; Normal Bowel sounds   EXTREMITIES:  2+ Peripheral Pulses, No clubbing, cyanosis, or edema  PSYCH: normal mood and affect, A&Ox3  NEUROLOGY: no focal neuro deficits  SKIN: No rashes or lesions

## 2025-02-07 NOTE — H&P ADULT - NSICDXFAMILYHX_GEN_ALL_CORE_FT
FAMILY HISTORY:  Mother  Still living? Unknown  Family history of essential hypertension, Age at diagnosis: Age Unknown

## 2025-02-07 NOTE — H&P ADULT - NSHPREVIEWOFSYSTEMS_GEN_ALL_CORE
REVIEW OF SYSTEMS:    CONSTITUTIONAL: No weakness, fevers or chills  EYES/ENT: No visual changes; No dysphagia; No sore throat; No rhinorrhea; No sinus pain/pressure  NECK: No pain or stiffness  RESPIRATORY: No cough, wheezing, hemoptysis; (+) shortness of breath associated with the chest discomfort last night  CARDIOVASCULAR: (+) chest pain as above; No palpitations; No lower extremity edema  GASTROINTESTINAL: No abdominal or epigastric pain. No nausea, vomiting, or hematemesis; No diarrhea or constipation. No melena or hematochezia. (+)reflux  GENITOURINARY: No dysuria, frequency or hematuria  NEUROLOGICAL: No numbness, paresthesias, or weakness; No HA; No LH/dizziness; no incontinence; no saddle anesthesia   MSK: ambulates without aid; reports lower back pain rad to L leg yesterday that resolved  SKIN: No itching, burning, rashes, or lesions   All other review of systems is negative unless indicated above.

## 2025-02-08 ENCOUNTER — RESULT REVIEW (OUTPATIENT)
Age: 40
End: 2025-02-08

## 2025-02-08 LAB
A1C WITH ESTIMATED AVERAGE GLUCOSE RESULT: 5.3 % — SIGNIFICANT CHANGE UP (ref 4–5.6)
ALBUMIN SERPL ELPH-MCNC: 4.1 G/DL — SIGNIFICANT CHANGE UP (ref 3.3–5)
ALP SERPL-CCNC: 57 U/L — SIGNIFICANT CHANGE UP (ref 40–120)
ALT FLD-CCNC: 13 U/L — SIGNIFICANT CHANGE UP (ref 4–41)
ANION GAP SERPL CALC-SCNC: 13 MMOL/L — SIGNIFICANT CHANGE UP (ref 7–14)
ANION GAP SERPL CALC-SCNC: 16 MMOL/L — HIGH (ref 7–14)
APTT BLD: 30.1 SEC — SIGNIFICANT CHANGE UP (ref 24.5–35.6)
APTT BLD: 35 SEC — SIGNIFICANT CHANGE UP (ref 24.5–35.6)
APTT BLD: 54 SEC — HIGH (ref 24.5–35.6)
AST SERPL-CCNC: 20 U/L — SIGNIFICANT CHANGE UP (ref 4–40)
BASOPHILS # BLD AUTO: 0.04 K/UL — SIGNIFICANT CHANGE UP (ref 0–0.2)
BASOPHILS NFR BLD AUTO: 0.9 % — SIGNIFICANT CHANGE UP (ref 0–2)
BILIRUB SERPL-MCNC: 0.8 MG/DL — SIGNIFICANT CHANGE UP (ref 0.2–1.2)
BUN SERPL-MCNC: 18 MG/DL — SIGNIFICANT CHANGE UP (ref 7–23)
BUN SERPL-MCNC: 19 MG/DL — SIGNIFICANT CHANGE UP (ref 7–23)
CALCIUM SERPL-MCNC: 8.9 MG/DL — SIGNIFICANT CHANGE UP (ref 8.4–10.5)
CALCIUM SERPL-MCNC: 9 MG/DL — SIGNIFICANT CHANGE UP (ref 8.4–10.5)
CHLORIDE SERPL-SCNC: 107 MMOL/L — SIGNIFICANT CHANGE UP (ref 98–107)
CHLORIDE SERPL-SCNC: 107 MMOL/L — SIGNIFICANT CHANGE UP (ref 98–107)
CHOLEST SERPL-MCNC: 163 MG/DL — SIGNIFICANT CHANGE UP
CK MB BLD-MCNC: <1.5 % — SIGNIFICANT CHANGE UP (ref 0–2.5)
CK MB CFR SERPL CALC: <1 NG/ML — SIGNIFICANT CHANGE UP
CK SERPL-CCNC: 66 U/L — SIGNIFICANT CHANGE UP (ref 30–200)
CO2 SERPL-SCNC: 16 MMOL/L — LOW (ref 22–31)
CO2 SERPL-SCNC: 20 MMOL/L — LOW (ref 22–31)
CREAT SERPL-MCNC: 0.99 MG/DL — SIGNIFICANT CHANGE UP (ref 0.5–1.3)
CREAT SERPL-MCNC: 1.06 MG/DL — SIGNIFICANT CHANGE UP (ref 0.5–1.3)
EGFR: 92 ML/MIN/1.73M2 — SIGNIFICANT CHANGE UP
EGFR: 99 ML/MIN/1.73M2 — SIGNIFICANT CHANGE UP
EOSINOPHIL # BLD AUTO: 0.01 K/UL — SIGNIFICANT CHANGE UP (ref 0–0.5)
EOSINOPHIL NFR BLD AUTO: 0.2 % — SIGNIFICANT CHANGE UP (ref 0–6)
ESTIMATED AVERAGE GLUCOSE: 105 — SIGNIFICANT CHANGE UP
GLUCOSE SERPL-MCNC: 87 MG/DL — SIGNIFICANT CHANGE UP (ref 70–99)
GLUCOSE SERPL-MCNC: 99 MG/DL — SIGNIFICANT CHANGE UP (ref 70–99)
HCT VFR BLD CALC: 47.1 % — SIGNIFICANT CHANGE UP (ref 39–50)
HCT VFR BLD CALC: 47.1 % — SIGNIFICANT CHANGE UP (ref 39–50)
HDLC SERPL-MCNC: 51 MG/DL — SIGNIFICANT CHANGE UP
HGB BLD-MCNC: 15.3 G/DL — SIGNIFICANT CHANGE UP (ref 13–17)
HGB BLD-MCNC: 15.8 G/DL — SIGNIFICANT CHANGE UP (ref 13–17)
IANC: 2.19 K/UL — SIGNIFICANT CHANGE UP (ref 1.8–7.4)
IMM GRANULOCYTES NFR BLD AUTO: 0.2 % — SIGNIFICANT CHANGE UP (ref 0–0.9)
INR BLD: 0.97 RATIO — SIGNIFICANT CHANGE UP (ref 0.85–1.16)
INR BLD: 1.06 RATIO — SIGNIFICANT CHANGE UP (ref 0.85–1.16)
LIPID PNL WITH DIRECT LDL SERPL: 92 MG/DL — SIGNIFICANT CHANGE UP
LYMPHOCYTES # BLD AUTO: 2.03 K/UL — SIGNIFICANT CHANGE UP (ref 1–3.3)
LYMPHOCYTES # BLD AUTO: 43.8 % — SIGNIFICANT CHANGE UP (ref 13–44)
MAGNESIUM SERPL-MCNC: 2.1 MG/DL — SIGNIFICANT CHANGE UP (ref 1.6–2.6)
MAGNESIUM SERPL-MCNC: SIGNIFICANT CHANGE UP MG/DL (ref 1.6–2.6)
MCHC RBC-ENTMCNC: 27.2 PG — SIGNIFICANT CHANGE UP (ref 27–34)
MCHC RBC-ENTMCNC: 27.3 PG — SIGNIFICANT CHANGE UP (ref 27–34)
MCHC RBC-ENTMCNC: 32.5 G/DL — SIGNIFICANT CHANGE UP (ref 32–36)
MCHC RBC-ENTMCNC: 33.5 G/DL — SIGNIFICANT CHANGE UP (ref 32–36)
MCV RBC AUTO: 81.5 FL — SIGNIFICANT CHANGE UP (ref 80–100)
MCV RBC AUTO: 83.7 FL — SIGNIFICANT CHANGE UP (ref 80–100)
MONOCYTES # BLD AUTO: 0.36 K/UL — SIGNIFICANT CHANGE UP (ref 0–0.9)
MONOCYTES NFR BLD AUTO: 7.8 % — SIGNIFICANT CHANGE UP (ref 2–14)
NEUTROPHILS # BLD AUTO: 2.19 K/UL — SIGNIFICANT CHANGE UP (ref 1.8–7.4)
NEUTROPHILS NFR BLD AUTO: 47.1 % — SIGNIFICANT CHANGE UP (ref 43–77)
NON HDL CHOLESTEROL: 112 MG/DL — SIGNIFICANT CHANGE UP
NRBC # BLD AUTO: 0 K/UL — SIGNIFICANT CHANGE UP (ref 0–0)
NRBC # BLD AUTO: 0 K/UL — SIGNIFICANT CHANGE UP (ref 0–0)
NRBC # BLD: 0 /100 WBCS — SIGNIFICANT CHANGE UP (ref 0–0)
NRBC # BLD: 0 /100 WBCS — SIGNIFICANT CHANGE UP (ref 0–0)
NRBC # FLD: 0 K/UL — SIGNIFICANT CHANGE UP (ref 0–0)
NRBC # FLD: 0 K/UL — SIGNIFICANT CHANGE UP (ref 0–0)
NRBC BLD-RTO: 0 /100 WBCS — SIGNIFICANT CHANGE UP (ref 0–0)
NRBC BLD-RTO: 0 /100 WBCS — SIGNIFICANT CHANGE UP (ref 0–0)
PHOSPHATE SERPL-MCNC: 3.2 MG/DL — SIGNIFICANT CHANGE UP (ref 2.5–4.5)
PHOSPHATE SERPL-MCNC: 3.4 MG/DL — SIGNIFICANT CHANGE UP (ref 2.5–4.5)
PLATELET # BLD AUTO: 202 K/UL — SIGNIFICANT CHANGE UP (ref 150–400)
PLATELET # BLD AUTO: 207 K/UL — SIGNIFICANT CHANGE UP (ref 150–400)
POTASSIUM SERPL-MCNC: 4.1 MMOL/L — SIGNIFICANT CHANGE UP (ref 3.5–5.3)
POTASSIUM SERPL-MCNC: 4.3 MMOL/L — SIGNIFICANT CHANGE UP (ref 3.5–5.3)
POTASSIUM SERPL-SCNC: 4.1 MMOL/L — SIGNIFICANT CHANGE UP (ref 3.5–5.3)
POTASSIUM SERPL-SCNC: 4.3 MMOL/L — SIGNIFICANT CHANGE UP (ref 3.5–5.3)
PROT SERPL-MCNC: 6.9 G/DL — SIGNIFICANT CHANGE UP (ref 6–8.3)
PROTHROM AB SERPL-ACNC: 11.6 SEC — SIGNIFICANT CHANGE UP (ref 9.9–13.4)
PROTHROM AB SERPL-ACNC: 12.3 SEC — SIGNIFICANT CHANGE UP (ref 9.9–13.4)
RBC # BLD: 5.63 M/UL — SIGNIFICANT CHANGE UP (ref 4.2–5.8)
RBC # BLD: 5.78 M/UL — SIGNIFICANT CHANGE UP (ref 4.2–5.8)
RBC # FLD: 14.2 % — SIGNIFICANT CHANGE UP (ref 10.3–14.5)
RBC # FLD: 14.4 % — SIGNIFICANT CHANGE UP (ref 10.3–14.5)
SODIUM SERPL-SCNC: 139 MMOL/L — SIGNIFICANT CHANGE UP (ref 135–145)
SODIUM SERPL-SCNC: 140 MMOL/L — SIGNIFICANT CHANGE UP (ref 135–145)
TRIGL SERPL-MCNC: 109 MG/DL — SIGNIFICANT CHANGE UP
TROPONIN T, HIGH SENSITIVITY RESULT: 6 NG/L — SIGNIFICANT CHANGE UP
TROPONIN T, HIGH SENSITIVITY RESULT: <6 NG/L — SIGNIFICANT CHANGE UP
TROPONIN T, HIGH SENSITIVITY RESULT: <6 NG/L — SIGNIFICANT CHANGE UP
TSH SERPL-MCNC: 1.82 UIU/ML — SIGNIFICANT CHANGE UP (ref 0.27–4.2)
WBC # BLD: 4.36 K/UL — SIGNIFICANT CHANGE UP (ref 3.8–10.5)
WBC # BLD: 4.64 K/UL — SIGNIFICANT CHANGE UP (ref 3.8–10.5)
WBC # FLD AUTO: 4.36 K/UL — SIGNIFICANT CHANGE UP (ref 3.8–10.5)
WBC # FLD AUTO: 4.64 K/UL — SIGNIFICANT CHANGE UP (ref 3.8–10.5)

## 2025-02-08 PROCEDURE — 93010 ELECTROCARDIOGRAM REPORT: CPT

## 2025-02-08 PROCEDURE — 93306 TTE W/DOPPLER COMPLETE: CPT | Mod: 26

## 2025-02-08 RX ORDER — AMLODIPINE BESYLATE 5 MG
5 TABLET ORAL DAILY
Refills: 0 | Status: DISCONTINUED | OUTPATIENT
Start: 2025-02-08 | End: 2025-02-09

## 2025-02-08 RX ORDER — HEPARIN SODIUM,PORCINE 10000/ML
4000 VIAL (ML) INJECTION EVERY 6 HOURS
Refills: 0 | Status: DISCONTINUED | OUTPATIENT
Start: 2025-02-08 | End: 2025-02-10

## 2025-02-08 RX ORDER — HEPARIN SODIUM,PORCINE 10000/ML
VIAL (ML) INJECTION
Qty: 25000 | Refills: 0 | Status: DISCONTINUED | OUTPATIENT
Start: 2025-02-08 | End: 2025-02-10

## 2025-02-08 RX ORDER — HEPARIN SODIUM,PORCINE 10000/ML
4000 VIAL (ML) INJECTION ONCE
Refills: 0 | Status: COMPLETED | OUTPATIENT
Start: 2025-02-08 | End: 2025-02-08

## 2025-02-08 RX ADMIN — ACETAMINOPHEN 650 MILLIGRAM(S): 160 SUSPENSION ORAL at 18:38

## 2025-02-08 RX ADMIN — Medication 5 MILLIGRAM(S): at 12:20

## 2025-02-08 RX ADMIN — Medication 900 UNIT(S)/HR: at 18:14

## 2025-02-08 RX ADMIN — Medication 900 UNIT(S)/HR: at 10:50

## 2025-02-08 RX ADMIN — Medication 4000 UNIT(S): at 10:42

## 2025-02-08 RX ADMIN — ACETAMINOPHEN 650 MILLIGRAM(S): 160 SUSPENSION ORAL at 09:00

## 2025-02-08 RX ADMIN — ASPIRIN 81 MILLIGRAM(S): 81 TABLET, COATED ORAL at 11:06

## 2025-02-08 RX ADMIN — ACETAMINOPHEN 650 MILLIGRAM(S): 160 SUSPENSION ORAL at 17:38

## 2025-02-08 RX ADMIN — Medication 900 UNIT(S)/HR: at 19:14

## 2025-02-08 RX ADMIN — FAMOTIDINE 20 MILLIGRAM(S): 10 INJECTION INTRAVENOUS at 21:34

## 2025-02-08 RX ADMIN — ACETAMINOPHEN 650 MILLIGRAM(S): 160 SUSPENSION ORAL at 08:12

## 2025-02-08 NOTE — PROVIDER CONTACT NOTE (OTHER) - ASSESSMENT
Patient received A&Ox4. NSR on telemetry. Patient c/o chest pain unchanged from admission but denies shortness of breath, or generalized, nausea, dizziness or any other pain. Pt satting well on RA . Vital signs as documented.

## 2025-02-08 NOTE — PROVIDER CONTACT NOTE (OTHER) - RECOMMENDATIONS
EKG performed and in chart, sent to Damaris Bangura via TEAMS. Cardiac enzymes, BMP and aPTT drawn. Patient initated on heparin gtt.

## 2025-02-08 NOTE — CONSULT NOTE ADULT - ASSESSMENT
A/P    39 -year-old male with recently diagnosed HTN (not yet on meds), recent epistaxis requiring a rhinorocket 2 weeks ago, presenting with intermittent chest pain that was reproducible on exam however with concerning EKG findings.      #Chest pain.   -recurrent chest pain, abnormal ECG with abnl st changes  -ecg abnl could be secondary to LVH, strain as well   -trop thus far negative  -cont iv heparin for poss acs/unstable angina   -trend trop   -cont asa  -check echom  -optimize BP  -plan for cath monday given rec sx, abnl ekg    #HTN urgency  -restart amlo 5 qd  -will next add toprol xl if hr can tolerate  -cehck echo   -check sec htn labs     ACP- Advanced Care Planning  -Advanced care planning discussed with patient. Advanced care planning forms discussed with patient and/or family.  Risks, benefits, and alternatives of medical/cardiac procedures were discussed in detail with all questions answered.  30 minutes were spent addressing advance care planning.      r/b/a of cath d/w pt  he agrees to proceed    fullcode    79 minutes spent on total encounter; more than 50% of the visit was spent counseling and/or coordinating care by the attending physician.  
· Assessment	  39 -year-old male with recently diagnosed HTN (not yet on meds), recent epistaxis requiring a rhinorocket 2 weeks ago, presenting with intermittent chest pain that was reproducible on exam however with concerning EKG findings.    Tele  Cardio eval appreciated  For C. Cath on Monday  IV Heparin  TTE: Reduced LVF  Cw Amlodipine for HTN

## 2025-02-08 NOTE — PROVIDER CONTACT NOTE (OTHER) - SITUATION
Patient c/o of chest pain- unchanged from admission. Patient denies SOB, nausea, dizziness or any other pain.

## 2025-02-08 NOTE — CONSULT NOTE ADULT - SUBJECTIVE AND OBJECTIVE BOX
Patient is a 39y old  Male who presents with a chief complaint of chest pain (08 Feb 2025 11:53)      HPI:  39 -year-old male with recently diagnosed HTN (not yet on meds) when he went to the hospital 2 weeks ago for epistaxis requiring a rhino rocket x24H, was not started on BP meds at that time as there was a concern for white coat hypertension so patient was told to f/u with his PCP. He went to his PCP earlier today (which is affiliated with [facility]) and had BP repeat and EKG. EKG was concerning and patient was given ASA 81mg x4 tablets and sent to the [facility] ED for further evaluation. Patient reports having L shoulder pain 3d ago that was radiating to the hand with associated paresthesias that resolved. He reports for the past 1 week he has had lower sternal burning pain that is intermittent, non-radiating, self resolving in minutes (<10min). He last experienced the pain earlier in the ED that was 6/10 in severity, improved with massage. Last night when he had the pain it was associated with shortness of breath however denies any nausea, diaphoresis or palpitations. He has a history of GERD however he states the pain does note feel exactly the same as the pain he had experienced in the past. He denies any worsening of the pain with food or exertion. He has never had a stress test or an echocardiogram. His mother has a history of HTN but denies any family history of MI/CAD. He reports recent anxiety regarding the diagnosis of HTN. Patient was prescribed amlodipine 10mg by PCP today however did not yet  the medication from pharmacy.    In the ED VS:  98.1  60-64  157-176/  16-18  96-98%RA (received ASA 324mg at 14:00 at his PCP office per patient report, denies any other medications being given). (07 Feb 2025 21:59)      PAST MEDICAL & SURGICAL HISTORY:  Marijuana smoker  sober for 51 days      H/O tonsillitis  and Adenoiditis  hospitalized at [facility]  for 2 days April 2021      MRSA infection  right upper extremity      Epistaxis      History of incision and drainage  right upper extremity secondary to mrsa infection approx 6 years ago          Review of Systems:   CONSTITUTIONAL: No fever,  or fatigue  NECK: No pain or stiffness  RESPIRATORY: No cough,  No shortness of breath  CARDIOVASCULAR: No chest pain, palpitations, dizziness, or leg swelling  GASTROINTESTINAL: No abdominal  pain. No nausea, vomiting.  NEUROLOGICAL: No headaches,           Allergies    Bactrim (Hives)  morphine (Rash)    Intolerances        Social History:     FAMILY HISTORY:  Family history of essential hypertension (Mother)        MEDICATIONS  (STANDING):  amLODIPine   Tablet 5 milliGRAM(s) Oral daily  aspirin enteric coated 81 milliGRAM(s) Oral daily  famotidine    Tablet 20 milliGRAM(s) Oral at bedtime  heparin  Infusion.  Unit(s)/Hr (9 mL/Hr) IV Continuous <Continuous>  influenza   Vaccine 0.5 milliLiter(s) IntraMuscular once    MEDICATIONS  (PRN):  acetaminophen     Tablet .. 650 milliGRAM(s) Oral every 6 hours PRN Mild Pain (1 - 3), Moderate Pain (4 - 6)  heparin   Injectable 4000 Unit(s) IV Push every 6 hours PRN For aPTT less than 40      CAPILLARY BLOOD GLUCOSE        I&O's Summary      T(C): 36.8 (02-08-25 @ 21:30), Max: 36.8 (02-08-25 @ 05:29)  HR: 60 (02-08-25 @ 21:30) (56 - 63)  BP: 137/98 (02-08-25 @ 21:30) (137/98 - 160/115)  RR: 17 (02-08-25 @ 21:30) (17 - 19)  SpO2: 100% (02-08-25 @ 21:30) (98% - 100%)    PHYSICAL EXAM:    GENERAL: NAD  NECK: Supple, No JVD  CHEST/LUNG: Clear to auscultation bilaterally; No wheezing.  HEART: Regular rate and rhythm; No murmurs, rubs, or gallops  ABDOMEN: Soft, Nontender, Nondistended; Bowel sounds present  EXTREMITIES:  2+ Peripheral Pulses, No edema  NEUROLOGY: AAOx 3      LABS:                        15.8   4.36  )-----------( 207      ( 08 Feb 2025 17:15 )             47.1     02-08    140  |  107  |  18  ----------------------------<  99  4.1   |  20[L]  |  0.99    Ca    8.9      08 Feb 2025 09:14  Phos  3.2     02-08  Mg     2.10     02-08    TPro  6.9  /  Alb  4.1  /  TBili  0.8  /  DBili  x   /  AST  20  /  ALT  13  /  AlkPhos  57  02-08    PT/INR - ( 08 Feb 2025 09:14 )   PT: 12.3 sec;   INR: 1.06 ratio         PTT - ( 08 Feb 2025 17:15 )  PTT:54.0 sec  CARDIAC MARKERS ( 08 Feb 2025 09:14 )  x     / x     / x     / x     / <1.0 ng/mL  CARDIAC MARKERS ( 07 Feb 2025 15:53 )  x     / x     / x     / x     / 1.2 ng/mL      Urinalysis Basic - ( 08 Feb 2025 09:14 )    Color: x / Appearance: x / SG: x / pH: x  Gluc: 99 mg/dL / Ketone: x  / Bili: x / Urobili: x   Blood: x / Protein: x / Nitrite: x   Leuk Esterase: x / RBC: x / WBC x   Sq Epi: x / Non Sq Epi: x / Bacteria: x      CAPILLARY BLOOD GLUCOSE            RADIOLOGY & ADDITIONAL TESTS:    Imaging Personally Reviewed:    Consultant(s) Notes Reviewed:      Care Discussed with Consultants/Other Providers:    Thanks for consult. Will follow.
CARDIOLOGY CONSULT NOTE - DR. BARAJAS        Date of Service: 02-08-25 @ 11:54      HPI:  39 -year-old male with recently diagnosed HTN (not yet on meds) when he went to the hospital 2 weeks ago for epistaxis requiring a rhino rocket x24H, was not started on BP meds at that time as there was a concern for white coat hypertension so patient was told to f/u with his PCP. He went to his PCP earlier today (which is affiliated with [facility]) and had BP repeat and EKG. EKG was concerning and patient was given ASA 81mg x4 tablets and sent to the [facility] ED for further evaluation. Patient reports having L shoulder pain 3d ago that was radiating to the hand with associated paresthesias that resolved. He reports for the past 1 week he has had lower sternal burning pain that is intermittent, non-radiating, self resolving in minutes (<10min). He last experienced the pain earlier in the ED that was 6/10 in severity, improved with massage. Last night when he had the pain it was associated with shortness of breath however denies any nausea, diaphoresis or palpitations. He has a history of GERD however he states the pain does note feel exactly the same as the pain he had experienced in the past. He denies any worsening of the pain with food or exertion. He has never had a stress test or an echocardiogram. His mother has a history of HTN but denies any family history of MI/CAD. He reports recent anxiety regarding the diagnosis of HTN. Patient was prescribed amlodipine 10mg by PCP today however did not yet  the medication from pharmacy.    In the ED VS:  98.1  60-64  157-176/  16-18  96-98%RA (received ASA 324mg at 14:00 at his PCP office per patient report, denies any other medications being given). (07 Feb 2025 21:59)    events noted   cp this am  no resolved        PAST MEDICAL & SURGICAL HISTORY:  Marijuana smoker  sober for 51 days      H/O tonsillitis  and Adenoiditis  hospitalized at [facility]  for 2 days April 2021      MRSA infection  right upper extremity      Epistaxis      History of incision and drainage  right upper extremity secondary to mrsa infection approx 6 years ago            PREVIOUS DIAGNOSTIC TESTING:    [ ] Echocardiogram:  [ ]  Catheterization:  [ ] Stress Test:  	    MEDICATIONS:    Home Medications:      MEDICATIONS  (STANDING):  aspirin enteric coated 81 milliGRAM(s) Oral daily  famotidine    Tablet 20 milliGRAM(s) Oral at bedtime  heparin  Infusion.  Unit(s)/Hr (9 mL/Hr) IV Continuous <Continuous>  influenza   Vaccine 0.5 milliLiter(s) IntraMuscular once      FAMILY HISTORY:  Family history of essential hypertension (Mother)        SOCIAL HISTORY:    [ x] Non-smoker  [ ] Smoker  [x ] Alcohol    Allergies    Bactrim (Hives)  morphine (Rash)    Intolerances    	    REVIEW OF SYSTEMS:  CONSTITUTIONAL: No fever, weight loss, or fatigue  EYES: No eye pain, visual disturbances, or discharge  ENMT:  No difficulty hearing, tinnitus, vertigo; No sinus or throat pain  NECK: No pain or stiffness  RESPIRATORY: No cough, wheezing, chills or hemoptysis; No Shortness of Breath  CARDIOVASCULAR: as HPI  GASTROINTESTINAL: No abdominal or epigastric pain. No nausea, vomiting, or hematemesis; No diarrhea or constipation. No melena or hematochezia.  GENITOURINARY: No dysuria, frequency, hematuria, or incontinence  NEUROLOGICAL: No headaches, memory loss, loss of strength, numbness, or tremors  SKIN: No itching, burning, rashes, or lesions   	  [ ] All others negative	  [ ] Unable to obtain    PHYSICAL EXAM:    T(C): 36.8 (02-08-25 @ 11:15), Max: 36.8 (02-08-25 @ 05:29)  HR: 56 (02-08-25 @ 11:15) (56 - 73)  BP: 151/112 (02-08-25 @ 11:15) (141/95 - 176/111)  RR: 18 (02-08-25 @ 11:15) (16 - 19)  SpO2: 99% (02-08-25 @ 11:15) (96% - 99%)  Wt(kg): --  I&O's Summary    Daily Height in cm: 170.18 (07 Feb 2025 14:44)    Daily     Appearance: Normal	  Psychiatry: A & O x 3, Mood & affect appropriate  HEENT:   Normal oral mucosa, PERRL, EOMI	  Lymphatic: No lymphadenopathy  Cardiovascular: Normal S1 S2,RRR, No JVD, No murmurs  Respiratory: Lungs clear to auscultation	  Gastrointestinal:  Soft, Non-tender, + BS	  Skin: No rashes, No ecchymoses, No cyanosis	  Neurologic: Non-focal  Extremities: Normal range of motion, No clubbing, cyanosis or edema  Vascular: Peripheral pulses palpable 2+ bilaterally    TELEMETRY: 	    ECG:  	sr  lvh  st abnl inlat leads  RADIOLOGY:  OTHER: 	  	  LABS:	 	    CARDIAC MARKERS:        proBNP:     Lipid Profile:   HgA1c:   TSH: Thyroid Stimulating Hormone, Serum: 1.82 uIU/mL (02-08-25 @ 05:35)                            15.3   4.64  )-----------( 202      ( 08 Feb 2025 06:30 )             47.1     02-08    140  |  107  |  18  ----------------------------<  99  4.1   |  20[L]  |  0.99    Ca    8.9      08 Feb 2025 09:14  Phos  3.2     02-08  Mg     2.10     02-08    TPro  6.9  /  Alb  4.1  /  TBili  0.8  /  DBili  x   /  AST  20  /  ALT  13  /  AlkPhos  57  02-08    PT/INR - ( 08 Feb 2025 09:14 )   PT: 12.3 sec;   INR: 1.06 ratio         PTT - ( 08 Feb 2025 09:14 )  PTT:30.1 sec    Creatinine: 0.99 mg/dL (02-08-25 @ 09:14)  Creatinine: 1.06 mg/dL (02-08-25 @ 05:35)  Creatinine: 0.86 mg/dL (02-07-25 @ 15:53)        ASSESSMENT/PLAN:

## 2025-02-08 NOTE — CHART NOTE - NSCHARTNOTEFT_GEN_A_CORE
Hospitalist Medicine NP    CC: Called by RN to evaluate pt with c/o chest pain   HPI: HPI:  39 -year-old male with recently diagnosed HTN (not yet on meds) when he went to the hospital 2 weeks ago for epistaxis requiring a rhino rocket x24H, was not started on BP meds at that time as there was a concern for white coat hypertension so patient was told to f/u with his PCP. He went to his PCP earlier today (which is affiliated with [facility]) and had BP repeat and EKG. EKG was concerning and patient was given ASA 81mg x4 tablets and sent to the [facility] ED for further evaluation. Patient reports having L shoulder pain 3d ago that was radiating to the hand with associated paresthesias that resolved. He reports for the past 1 week he has had lower sternal burning pain that is intermittent, non-radiating, self resolving in minutes (<10min). He last experienced the pain earlier in the ED that was 6/10 in severity, improved with massage. Last night when he had the pain it was associated with shortness of breath however denies any nausea, diaphoresis or palpitations. He has a history of GERD however he states the pain does note feel exactly the same as the pain he had experienced in the past. He denies any worsening of the pain with food or exertion. He has never had a stress test or an echocardiogram. His mother has a history of HTN but denies any family history of MI/CAD. He reports recent anxiety regarding the diagnosis of HTN. Patient was prescribed amlodipine 10mg by PCP today however did not yet  the medication from pharmacy.    In the ED VS:  98.1  60-64  157-176/  16-18  96-98%RA (received ASA 324mg at 14:00 at his PCP office per patient report, denies any other medications being given). (07 Feb 2025 21:59)  . Pt seen and examined at bedside. Pt c/o     REVIEW OF SYSTEMS:  Respiratory: Denies cough, wheezing, chills, hemoptysis, shortness of breath, difficulty breathing  Cardiovascular: Denies chest pain, palpitations, dizziness, leg swelling  Gastrointestinal: Denies abdominal pain, nausea, vomiting, hematemesis, diarrhea, constipation, melena, hematochezia  Genitourinary: Denies dysuria, frequency, hematuria, retention, incontinence  Neurological: Denies headaches, memory loss, loss of strength, numbness, tremors    VITALS:  T(C): 36.8 (02-08-25 @ 05:50), Max: 36.8 (02-08-25 @ 05:29)  HR: 63 (02-08-25 @ 05:50) (59 - 73)  BP: 149/100 (02-08-25 @ 05:50) (141/95 - 176/111)  RR: 18 (02-08-25 @ 05:50) (16 - 19)  SpO2: 98% (02-08-25 @ 05:50) (96% - 98%)  Wt(kg): --    PHYSICAL EXAM:  General: NAD, well-groomed, well-developed.  Eyes: PERRLA, EOMI. Conjunctiva and sclera clear.  Lungs: Airway patent. CTA B/L. Normal breath sounds. No wheezes, rales, rhonchi.  Heart: RRR. Normal S1/S2. No murmurs appreciated.  Abdomen: Soft, NT/ND. Normoactive BS x 4 quadrants.  Neurological:  AAOx3. Good concentration. Strength 5/5 B/L upper and lower extremities. No pronator drift.  Extremities:  2+ B/L peripheral pulses. No clubbing, cyanosis, or edema.    LABS:  CAPILLARY BLOOD GLUCOSE                              15.3   4.64  )-----------( 202      ( 08 Feb 2025 06:30 )             47.1     02-08    139  |  107  |  19  ----------------------------<  87  4.3   |  16[L]  |  1.06    Ca    9.0      08 Feb 2025 05:35  Phos  3.4     02-08  Mg     QNS     02-08    TPro  6.9  /  Alb  4.1  /  TBili  0.8  /  DBili  x   /  AST  20  /  ALT  13  /  AlkPhos  57  02-08    CARDIAC MARKERS ( 07 Feb 2025 15:53 )  x     / x     / x     / x     / 1.2 ng/mL      PT/INR - ( 07 Feb 2025 23:56 )   PT: 11.6 sec;   INR: 0.97 ratio         PTT - ( 07 Feb 2025 23:56 )  PTT:35.0 sec  LIVER FUNCTIONS - ( 08 Feb 2025 05:35 )  Alb: 4.1 g/dL / Pro: 6.9 g/dL / ALK PHOS: 57 U/L / ALT: 13 U/L / AST: 20 U/L / GGT: x                                     ASSESSMENT:  Patient is a 39y with Primary hypertension    No pertinent past medical history    Marijuana smoker    H/O tonsillitis    MRSA infection    Epistaxis    No significant past surgical history    History of MRSA infection    History of incision and drainage        PLAN:  -   - Continue current treatment  - Follow up labs/tests  - D/w                       aware and agree with the plan  - Will continue to follow up    Time spent on encounter _____ minutes. Hospitalist Medicine NP    CC: Called by RN to evaluate pt with c/o chest pain   HPI: HPI:  39 -year-old male with recently diagnosed HTN (not yet on meds) when he went to the hospital 2 weeks ago for epistaxis requiring a rhino rocket x24H, was not started on BP meds at that time as there was a concern for white coat hypertension so patient was told to f/u with his PCP. He went to his PCP earlier today (which is affiliated with [facility]) and had BP repeat and EKG. EKG was concerning and patient was given ASA 81mg x4 tablets and sent to the [facility] ED for further evaluation. Patient reports having L shoulder pain 3d ago that was radiating to the hand with associated paresthesias that resolved. He reports for the past 1 week he has had lower sternal burning pain that is intermittent, non-radiating, self resolving in minutes (<10min). He last experienced the pain earlier in the ED that was 6/10 in severity, improved with massage. Last night when he had the pain it was associated with shortness of breath however denies any nausea, diaphoresis or palpitations. He has a history of GERD however he states the pain does note feel exactly the same as the pain he had experienced in the past. He denies any worsening of the pain with food or exertion. He has never had a stress test or an echocardiogram. His mother has a history of HTN but denies any family history of MI/CAD. He reports recent anxiety regarding the diagnosis of HTN. Patient was prescribed amlodipine 10mg by PCP today however did not yet  the medication from pharmacy.    REVIEW OF SYSTEMS:  Respiratory: Denies cough, wheezing, chills, hemoptysis, shortness of breath, difficulty breathing  Cardiovascular: Denies chest pain, palpitations, dizziness, leg swelling  Gastrointestinal: Denies abdominal pain, nausea, vomiting, hematemesis, diarrhea, constipation, melena, hematochezia  Genitourinary: Denies dysuria, frequency, hematuria, retention, incontinence  Neurological: Denies headaches, memory loss, loss of strength, numbness, tremors    VITALS:  T(C): 36.8 (02-08-25 @ 05:50), Max: 36.8 (02-08-25 @ 05:29)  HR: 63 (02-08-25 @ 05:50) (59 - 73)  BP: 149/100 (02-08-25 @ 05:50) (141/95 - 176/111)  RR: 18 (02-08-25 @ 05:50) (16 - 19)  SpO2: 98% (02-08-25 @ 05:50) (96% - 98%)      PHYSICAL EXAM:  General: NAD, well-groomed, well-developed.  Eyes: PERRLA, EOMI. Conjunctiva and sclera clear.  Lungs: Airway patent. CTA B/L. Normal breath sounds. No wheezes, rales, rhonchi.  Heart: RRR. Normal S1/S2. No murmurs appreciated.  Abdomen: Soft, NT/ND. Normoactive BS x 4 quadrants.  Neurological:  AAOx3. Good concentration. Strength 5/5 B/L upper and lower extremities. No pronator drift.  Extremities:  2+ B/L peripheral pulses. No clubbing, cyanosis, or edema.    LABS:  CAPILLARY BLOOD GLUCOSE                              15.3   4.64  )-----------( 202      ( 08 Feb 2025 06:30 )             47.1     02-08    139  |  107  |  19  ----------------------------<  87  4.3   |  16[L]  |  1.06    Ca    9.0      08 Feb 2025 05:35  Phos  3.4     02-08  Mg     QNS     02-08    TPro  6.9  /  Alb  4.1  /  TBili  0.8  /  DBili  x   /  AST  20  /  ALT  13  /  AlkPhos  57  02-08    CARDIAC MARKERS ( 07 Feb 2025 15:53 )  x     / x     / x     / x     / 1.2 ng/mL      PT/INR - ( 07 Feb 2025 23:56 )   PT: 11.6 sec;   INR: 0.97 ratio         PTT - ( 07 Feb 2025 23:56 )  PTT:35.0 sec  LIVER FUNCTIONS - ( 08 Feb 2025 05:35 )  Alb: 4.1 g/dL / Pro: 6.9 g/dL / ALK PHOS: 57 U/L / ALT: 13 U/L / AST: 20 U/L / GGT: x                                     ASSESSMENT:  Patient is a 39y with Primary hypertension    No pertinent past medical history    Marijuana smoker    H/O tonsillitis    MRSA infection    Epistaxis    No significant past surgical history    History of MRSA infection    History of incision and drainage        PLAN:  - Obtain EKG and Cardiac Enzymes  - EKG  NSR 61 bpm, ST elevation in lead I, aVL, V2,V3 with ST depression and TWI in III, aVF; QTc 414. Patient had TWI in III, aVF in past however more pronounced on current EKG.   - Case discussed with Dr. Morris. Place on heparin therapy for possible unstable angina      Time spent on encounter _____ minutes. Hospitalist Medicine NP    CC: Called by RN to evaluate pt with c/o intermittent chest pain  that is reproducible on exam  HPI: HPI:  39 -year-old male with recently diagnosed HTN (not yet on meds) when he went to the hospital 2 weeks ago for epistaxis requiring a rhino rocket x24H, was not started on BP meds at that time as there was a concern for white coat hypertension so patient was told to f/u with his PCP. He went to his PCP earlier today (which is affiliated with [facility]) and had BP repeat and EKG. EKG was concerning and patient was given ASA 81mg x4 tablets and sent to the [facility] ED for further evaluation. Patient reports having L shoulder pain 3d ago that was radiating to the hand with associated paresthesias that resolved. He reports for the past 1 week he has had lower sternal burning pain that is intermittent, non-radiating, self resolving in minutes (<10min). He last experienced the pain earlier in the ED that was 6/10 in severity, improved with massage. Last night when he had the pain it was associated with shortness of breath however denies any nausea, diaphoresis or palpitations. He has a history of GERD however he states the pain does note feel exactly the same as the pain he had experienced in the past. He denies any worsening of the pain with food or exertion. He has never had a stress test or an echocardiogram. His mother has a history of HTN but denies any family history of MI/CAD. He reports recent anxiety regarding the diagnosis of HTN. Patient was prescribed amlodipine 10mg by PCP today however did not yet  the medication from pharmacy.    REVIEW OF SYSTEMS:  Respiratory: Denies cough, wheezing, chills, hemoptysis, shortness of breath, difficulty breathing  Cardiovascular: Denies chest pain, palpitations, dizziness, leg swelling  Gastrointestinal: Denies abdominal pain, nausea, vomiting, hematemesis, diarrhea, constipation, melena, hematochezia  Genitourinary: Denies dysuria, frequency, hematuria, retention, incontinence  Neurological: Denies headaches, memory loss, loss of strength, numbness, tremors    VITALS:  T(C): 36.8 (02-08-25 @ 05:50), Max: 36.8 (02-08-25 @ 05:29)  HR: 63 (02-08-25 @ 05:50) (59 - 73)  BP: 149/100 (02-08-25 @ 05:50) (141/95 - 176/111)  RR: 18 (02-08-25 @ 05:50) (16 - 19)  SpO2: 98% (02-08-25 @ 05:50) (96% - 98%)      PHYSICAL EXAM:  General: NAD, well-groomed, well-developed.  Eyes: PERRLA, EOMI. Conjunctiva and sclera clear.  Lungs: Airway patent. CTA B/L. Normal breath sounds. No wheezes, rales, rhonchi.  Heart: RRR. Normal S1/S2. No murmurs appreciated.  Abdomen: Soft, NT/ND. Normoactive BS x 4 quadrants.  Neurological:  AAOx3. Good concentration. Strength 5/5 B/L upper and lower extremities. No pronator drift.  Extremities:  2+ B/L peripheral pulses. No clubbing, cyanosis, or edema.    LABS:  CAPILLARY BLOOD GLUCOSE                              15.3   4.64  )-----------( 202      ( 08 Feb 2025 06:30 )             47.1     02-08    139  |  107  |  19  ----------------------------<  87  4.3   |  16[L]  |  1.06    Ca    9.0      08 Feb 2025 05:35  Phos  3.4     02-08  Mg     QNS     02-08    TPro  6.9  /  Alb  4.1  /  TBili  0.8  /  DBili  x   /  AST  20  /  ALT  13  /  AlkPhos  57  02-08    CARDIAC MARKERS ( 07 Feb 2025 15:53 )  x     / x     / x     / x     / 1.2 ng/mL      PT/INR - ( 07 Feb 2025 23:56 )   PT: 11.6 sec;   INR: 0.97 ratio         PTT - ( 07 Feb 2025 23:56 )  PTT:35.0 sec  LIVER FUNCTIONS - ( 08 Feb 2025 05:35 )  Alb: 4.1 g/dL / Pro: 6.9 g/dL / ALK PHOS: 57 U/L / ALT: 13 U/L / AST: 20 U/L / GGT: x                                     ASSESSMENT:  Patient is a 39y with Primary hypertension    No pertinent past medical history    Marijuana smoker    H/O tonsillitis    MRSA infection    Epistaxis    No significant past surgical history    History of MRSA infection    History of incision and drainage        PLAN:  - Obtain EKG and Cardiac Enzymes  - EKG  NSR 61 bpm, ST elevation in lead I, aVL, V2,V3 with ST depression and TWI in III, aVF; QTc 414. Patient had TWI in III, aVF in past however more pronounced on current EKG.   - Case discussed with Dr. Morris. Place on heparin therapy for possible unstable angina      Time spent on encounter _____ minutes.

## 2025-02-09 LAB
ANION GAP SERPL CALC-SCNC: 14 MMOL/L — SIGNIFICANT CHANGE UP (ref 7–14)
APTT BLD: 51.1 SEC — HIGH (ref 24.5–35.6)
APTT BLD: 55.6 SEC — HIGH (ref 24.5–35.6)
APTT BLD: 67.2 SEC — HIGH (ref 24.5–35.6)
BUN SERPL-MCNC: 16 MG/DL — SIGNIFICANT CHANGE UP (ref 7–23)
CALCIUM SERPL-MCNC: 9.5 MG/DL — SIGNIFICANT CHANGE UP (ref 8.4–10.5)
CHLORIDE SERPL-SCNC: 103 MMOL/L — SIGNIFICANT CHANGE UP (ref 98–107)
CO2 SERPL-SCNC: 20 MMOL/L — LOW (ref 22–31)
CREAT SERPL-MCNC: 0.97 MG/DL — SIGNIFICANT CHANGE UP (ref 0.5–1.3)
EGFR: 102 ML/MIN/1.73M2 — SIGNIFICANT CHANGE UP
GLUCOSE SERPL-MCNC: 91 MG/DL — SIGNIFICANT CHANGE UP (ref 70–99)
HCT VFR BLD CALC: 48.7 % — SIGNIFICANT CHANGE UP (ref 39–50)
HGB BLD-MCNC: 16.1 G/DL — SIGNIFICANT CHANGE UP (ref 13–17)
MAGNESIUM SERPL-MCNC: 2.1 MG/DL — SIGNIFICANT CHANGE UP (ref 1.6–2.6)
MCHC RBC-ENTMCNC: 27.2 PG — SIGNIFICANT CHANGE UP (ref 27–34)
MCHC RBC-ENTMCNC: 33.1 G/DL — SIGNIFICANT CHANGE UP (ref 32–36)
MCV RBC AUTO: 82.4 FL — SIGNIFICANT CHANGE UP (ref 80–100)
NRBC # BLD AUTO: 0 K/UL — SIGNIFICANT CHANGE UP (ref 0–0)
NRBC # BLD: 0 /100 WBCS — SIGNIFICANT CHANGE UP (ref 0–0)
NRBC # FLD: 0 K/UL — SIGNIFICANT CHANGE UP (ref 0–0)
NRBC BLD-RTO: 0 /100 WBCS — SIGNIFICANT CHANGE UP (ref 0–0)
PHOSPHATE SERPL-MCNC: 3.2 MG/DL — SIGNIFICANT CHANGE UP (ref 2.5–4.5)
PLATELET # BLD AUTO: 187 K/UL — SIGNIFICANT CHANGE UP (ref 150–400)
POTASSIUM SERPL-MCNC: 3.8 MMOL/L — SIGNIFICANT CHANGE UP (ref 3.5–5.3)
POTASSIUM SERPL-SCNC: 3.8 MMOL/L — SIGNIFICANT CHANGE UP (ref 3.5–5.3)
RBC # BLD: 5.91 M/UL — HIGH (ref 4.2–5.8)
RBC # FLD: 14 % — SIGNIFICANT CHANGE UP (ref 10.3–14.5)
SODIUM SERPL-SCNC: 137 MMOL/L — SIGNIFICANT CHANGE UP (ref 135–145)
WBC # BLD: 5.07 K/UL — SIGNIFICANT CHANGE UP (ref 3.8–10.5)
WBC # FLD AUTO: 5.07 K/UL — SIGNIFICANT CHANGE UP (ref 3.8–10.5)

## 2025-02-09 PROCEDURE — 93010 ELECTROCARDIOGRAM REPORT: CPT

## 2025-02-09 RX ORDER — METOPROLOL SUCCINATE 25 MG
25 TABLET, EXTENDED RELEASE 24 HR ORAL DAILY
Refills: 0 | Status: DISCONTINUED | OUTPATIENT
Start: 2025-02-09 | End: 2025-02-11

## 2025-02-09 RX ORDER — LOSARTAN POTASSIUM 100 MG
50 TABLET ORAL DAILY
Refills: 0 | Status: DISCONTINUED | OUTPATIENT
Start: 2025-02-09 | End: 2025-02-10

## 2025-02-09 RX ORDER — AMLODIPINE BESYLATE 5 MG
10 TABLET ORAL DAILY
Refills: 0 | Status: DISCONTINUED | OUTPATIENT
Start: 2025-02-10 | End: 2025-02-11

## 2025-02-09 RX ADMIN — ACETAMINOPHEN 650 MILLIGRAM(S): 160 SUSPENSION ORAL at 05:04

## 2025-02-09 RX ADMIN — Medication 1050 UNIT(S)/HR: at 08:53

## 2025-02-09 RX ADMIN — ACETAMINOPHEN 650 MILLIGRAM(S): 160 SUSPENSION ORAL at 06:00

## 2025-02-09 RX ADMIN — Medication 1050 UNIT(S)/HR: at 01:19

## 2025-02-09 RX ADMIN — ACETAMINOPHEN 650 MILLIGRAM(S): 160 SUSPENSION ORAL at 15:15

## 2025-02-09 RX ADMIN — Medication 1050 UNIT(S)/HR: at 15:53

## 2025-02-09 RX ADMIN — Medication 25 MILLIGRAM(S): at 16:32

## 2025-02-09 RX ADMIN — Medication 1050 UNIT(S)/HR: at 19:23

## 2025-02-09 RX ADMIN — Medication 5 MILLIGRAM(S): at 05:04

## 2025-02-09 RX ADMIN — ASPIRIN 81 MILLIGRAM(S): 81 TABLET, COATED ORAL at 10:56

## 2025-02-09 RX ADMIN — Medication 1050 UNIT(S)/HR: at 07:28

## 2025-02-09 RX ADMIN — FAMOTIDINE 20 MILLIGRAM(S): 10 INJECTION INTRAVENOUS at 21:17

## 2025-02-09 RX ADMIN — Medication 50 MILLIGRAM(S): at 14:37

## 2025-02-09 RX ADMIN — ACETAMINOPHEN 650 MILLIGRAM(S): 160 SUSPENSION ORAL at 16:00

## 2025-02-10 ENCOUNTER — TRANSCRIPTION ENCOUNTER (OUTPATIENT)
Age: 40
End: 2025-02-10

## 2025-02-10 VITALS — DIASTOLIC BLOOD PRESSURE: 98 MMHG | SYSTOLIC BLOOD PRESSURE: 145 MMHG

## 2025-02-10 LAB
ALDOST SERPL-MCNC: 4.1 NG/DL — SIGNIFICANT CHANGE UP
ANION GAP SERPL CALC-SCNC: 10 MMOL/L — SIGNIFICANT CHANGE UP (ref 7–14)
APTT BLD: 49.4 SEC — HIGH (ref 24.5–35.6)
BUN SERPL-MCNC: 16 MG/DL — SIGNIFICANT CHANGE UP (ref 7–23)
CALCIUM SERPL-MCNC: 9.1 MG/DL — SIGNIFICANT CHANGE UP (ref 8.4–10.5)
CHLORIDE SERPL-SCNC: 105 MMOL/L — SIGNIFICANT CHANGE UP (ref 98–107)
CO2 SERPL-SCNC: 22 MMOL/L — SIGNIFICANT CHANGE UP (ref 22–31)
CREAT SERPL-MCNC: 1.04 MG/DL — SIGNIFICANT CHANGE UP (ref 0.5–1.3)
EGFR: 94 ML/MIN/1.73M2 — SIGNIFICANT CHANGE UP
GLUCOSE SERPL-MCNC: 100 MG/DL — HIGH (ref 70–99)
HCT VFR BLD CALC: 46.5 % — SIGNIFICANT CHANGE UP (ref 39–50)
HGB BLD-MCNC: 16 G/DL — SIGNIFICANT CHANGE UP (ref 13–17)
MAGNESIUM SERPL-MCNC: 2.1 MG/DL — SIGNIFICANT CHANGE UP (ref 1.6–2.6)
MCHC RBC-ENTMCNC: 27.4 PG — SIGNIFICANT CHANGE UP (ref 27–34)
MCHC RBC-ENTMCNC: 34.4 G/DL — SIGNIFICANT CHANGE UP (ref 32–36)
MCV RBC AUTO: 79.8 FL — LOW (ref 80–100)
NRBC # BLD AUTO: 0 K/UL — SIGNIFICANT CHANGE UP (ref 0–0)
NRBC # BLD: 0 /100 WBCS — SIGNIFICANT CHANGE UP (ref 0–0)
NRBC # FLD: 0 K/UL — SIGNIFICANT CHANGE UP (ref 0–0)
NRBC BLD-RTO: 0 /100 WBCS — SIGNIFICANT CHANGE UP (ref 0–0)
PHOSPHATE SERPL-MCNC: 3.2 MG/DL — SIGNIFICANT CHANGE UP (ref 2.5–4.5)
PLATELET # BLD AUTO: 195 K/UL — SIGNIFICANT CHANGE UP (ref 150–400)
POTASSIUM SERPL-MCNC: 4.1 MMOL/L — SIGNIFICANT CHANGE UP (ref 3.5–5.3)
POTASSIUM SERPL-SCNC: 4.1 MMOL/L — SIGNIFICANT CHANGE UP (ref 3.5–5.3)
RBC # BLD: 5.83 M/UL — HIGH (ref 4.2–5.8)
RBC # FLD: 13.3 % — SIGNIFICANT CHANGE UP (ref 10.3–14.5)
RENIN DIRECT, PLASMA: <2.1 PG/ML — SIGNIFICANT CHANGE UP
SODIUM SERPL-SCNC: 137 MMOL/L — SIGNIFICANT CHANGE UP (ref 135–145)
WBC # BLD: 4.88 K/UL — SIGNIFICANT CHANGE UP (ref 3.8–10.5)
WBC # FLD AUTO: 4.88 K/UL — SIGNIFICANT CHANGE UP (ref 3.8–10.5)

## 2025-02-10 PROCEDURE — 93458 L HRT ARTERY/VENTRICLE ANGIO: CPT | Mod: 26

## 2025-02-10 PROCEDURE — 99152 MOD SED SAME PHYS/QHP 5/>YRS: CPT

## 2025-02-10 RX ORDER — ACETAMINOPHEN 160 MG/5ML
1000 SUSPENSION ORAL ONCE
Refills: 0 | Status: COMPLETED | OUTPATIENT
Start: 2025-02-10 | End: 2025-02-10

## 2025-02-10 RX ORDER — LOSARTAN POTASSIUM 100 MG
50 TABLET ORAL ONCE
Refills: 0 | Status: DISCONTINUED | OUTPATIENT
Start: 2025-02-10 | End: 2025-02-11

## 2025-02-10 RX ORDER — METOPROLOL SUCCINATE 25 MG
1 TABLET, EXTENDED RELEASE 24 HR ORAL
Qty: 0 | Refills: 0 | DISCHARGE
Start: 2025-02-10

## 2025-02-10 RX ORDER — LOSARTAN POTASSIUM 100 MG
1 TABLET ORAL
Qty: 30 | Refills: 0
Start: 2025-02-10

## 2025-02-10 RX ORDER — FAMOTIDINE 10 MG/ML
1 INJECTION INTRAVENOUS
Qty: 0 | Refills: 0 | DISCHARGE
Start: 2025-02-10

## 2025-02-10 RX ORDER — AMLODIPINE BESYLATE 5 MG
0.5 TABLET ORAL
Refills: 0 | DISCHARGE
Start: 2025-02-10

## 2025-02-10 RX ORDER — LOSARTAN POTASSIUM 100 MG
100 TABLET ORAL DAILY
Refills: 0 | Status: DISCONTINUED | OUTPATIENT
Start: 2025-02-11 | End: 2025-02-11

## 2025-02-10 RX ORDER — ASPIRIN 81 MG/1
1 TABLET, COATED ORAL
Qty: 0 | Refills: 0 | DISCHARGE
Start: 2025-02-10

## 2025-02-10 RX ORDER — LOSARTAN POTASSIUM 100 MG
1 TABLET ORAL
Qty: 0 | Refills: 0 | DISCHARGE
Start: 2025-02-10

## 2025-02-10 RX ADMIN — ASPIRIN 81 MILLIGRAM(S): 81 TABLET, COATED ORAL at 12:02

## 2025-02-10 RX ADMIN — Medication 10 MILLIGRAM(S): at 05:30

## 2025-02-10 RX ADMIN — ACETAMINOPHEN 400 MILLIGRAM(S): 160 SUSPENSION ORAL at 12:01

## 2025-02-10 RX ADMIN — Medication 50 MILLIGRAM(S): at 05:08

## 2025-02-10 RX ADMIN — ACETAMINOPHEN 1000 MILLIGRAM(S): 160 SUSPENSION ORAL at 12:40

## 2025-02-10 RX ADMIN — Medication 25 MILLIGRAM(S): at 05:09

## 2025-02-10 NOTE — PROGRESS NOTE ADULT - ASSESSMENT
A/P    39 -year-old male with recently diagnosed HTN (not yet on meds), recent epistaxis requiring a rhinorocket 2 weeks ago, presenting with intermittent chest pain that was reproducible on exam however with concerning EKG findings.      #Chest pain.   -recurrent chest pain, abnormal ECG with abnl st changes  -ecg abnl could be secondary to LVH, strain as well   -trop thus far negative  -cont iv heparin for poss acs/unstable angina   -trend trop   -cont asa  -echo with mild global lv dysfxn, likely sec to htn   -optimize BP  -plan for cath yazan given rec sx, abnl ekg    #HTN urgency, mild cmp likely sec to HTN  -cont amlo 5 qd  -add losartan 25 qd  -will next add toprol xl if hr can tolerate  -echo as above  -f/u sec htn labs     55 minutes spent on total encounter; more than 50% of the visit was spent counseling and/or coordinating care by the attending physician.  
A/P    39 -year-old male with recently diagnosed HTN (not yet on meds), recent epistaxis requiring a rhinorocket 2 weeks ago, presenting with intermittent chest pain that was reproducible on exam however with concerning EKG findings.      #Chest pain.   -recurrent chest pain, abnormal ECG with abnl st changes  -ecg abnl could be secondary to LVH, strain as well   -trop thus far negative  -s/p cath with no severe CAD  -cont asa  -echo with mild global lv dysfxn, likely sec to htn   -optimize BP    #HTN urgency, mild cmp likely sec to HTN  -cont amlo 10 qd  -cont toprol xl 25 qd  -cont losartan, inc to 100 qd  -echo as above  -f/u sec htn labs   -f/u renal artery sono      dc pending renal artery sono        55 minutes spent on total encounter; more than 50% of the visit was spent counseling and/or coordinating care by the attending physician.  
A/P   Chest pain : resloved   seen by cardio   scho shows global LV dysfuction  pt. is on heparin GTT    plan for cardiac cath in am     HTN :   c/w amlodipine and losartan     dispo: scheduled for cardiac cath in am   
A/P   Chest pain : resloved   seen by cardio   scho shows global LV dysfuction  pt. is on heparin GTT    Cardiac cath:  Normal coronary arteries.     HTN :   c/w amlodipine and losartan   Renal A doppler

## 2025-02-10 NOTE — DISCHARGE NOTE PROVIDER - HOSPITAL COURSE
39 -year-old male with recently diagnosed HTN (not yet on meds), recent epistaxis requiring a rhinorocket 2 weeks ago, presenting with intermittent chest pain that was reproducible on exam however with concerning EKG findings.      #Chest pain.   -recurrent chest pain, abnormal ECG with abnl st changes  -ecg abnl could be secondary to LVH, strain as well   -trop thus far negative  -s/p cath with no severe CAD  -cont asa  -echo with mild global lv dysfxn, likely sec to htn   -optimize BP    #HTN urgency, mild cmp likely sec to HTN  -cont amlo 10 qd  -cont toprol xl 25 qd  -cont losartan, inc to 100 qd  -echo as above  -f/u sec htn labs   -f/u renal artery sono      dc pending renal artery sono       39 -year-old male with recently diagnosed HTN (not yet on meds), recent epistaxis requiring a rhinorocket 2 weeks ago, presenting with intermittent chest pain that was reproducible on exam however with concerning EKG findings.      #Chest pain.   -recurrent chest pain, abnormal ECG with abnl st changes  -ecg abnl could be secondary to LVH, strain as well   -trop thus far negative  -s/p cath with no severe CAD  -cont asa  -echo with mild global lv dysfxn, likely sec to htn   -optimize BP    #HTN urgency, mild cmp likely sec to HTN  -cont amlo 10 qd  -cont toprol xl 25 qd  -cont losartan, inc to 100 qd  -echo as above  -f/u sec htn labs   -f/u renal artery sono      dc pending renal artery sono      Called by nurse to evaluate patient who wishes to leave the hospital against medical advice. Patient is A&Ox4 and demonstrates full capacity to make his OR her own medical decisions. Pt was informed of their medical conditions, plan of care and the risks of refusing treatment. These risks including stroke, brain bleed, syncope and even death were explained to patient. Explained to patient is symptoms occur to come back to the hospital. Advised patient to follow up with Dr. Morris out patient. Patient expressed full understanding. Patient seen at bedside multiple times. Patient left without signing AMA form. Medications sent to patients pharmacy. Attending Dr. Morris aware.

## 2025-02-10 NOTE — DISCHARGE NOTE PROVIDER - NSDCCPCAREPLAN_GEN_ALL_CORE_FT
PRINCIPAL DISCHARGE DIAGNOSIS  Diagnosis: Chest pain  Assessment and Plan of Treatment: YOu were admitted with Chest pain, You ruled out for a heart attack, You underwent a cardiac cath which no significant blockages.  Please Follow up with Your Cardiologist in 1week      SECONDARY DISCHARGE DIAGNOSES  Diagnosis: Essential hypertension  Assessment and Plan of Treatment: Low sodium and fat diet, continue anti-hypertensive medications, and follow up with primary care physician.      Diagnosis: Abnormal EKG  Assessment and Plan of Treatment:

## 2025-02-10 NOTE — PRE-OP CHECKLIST - ALLERGY BAND ON
**** GI/ENDOSCOPY REPORT ****     PATIENT NAME: Malathi Moreno - VISIT ID:  Patient ID: [id number]   YOB: 1961     INTRODUCTION: Esophagogastroduodenoscopy - A 64 male patient presents for   an outpatient Esophagogastroduodenoscopy at [facility]  INDICATIONS: Follow-up of esophagitis  CONSENT: The benefits, risks, and alternatives to the procedure were   discussed and informed consent was obtained from the patient  PREPARATION:  EKG, pulse, pulse oximetry and blood pressure were monitored   throughout the procedure  MEDICATIONS: See anesthesia report  PROCEDURE:  The endoscope was passed without difficulty through the mouth   under direct visualization and advanced to the 2nd portion of the   duodenum  The scope was withdrawn and the mucosa was carefully examined  FINDINGS:   Esophagus: There was a 3 cm hiatus hernia visible in the   esophagus  The entire esophagus appeared to be normal    Stomach: The   entire stomach appeared to be normal   Duodenum: The entire duodenum   appeared to be normal      COMPLICATIONS: There were no complications  IMPRESSIONS: A hiatus hernia found  Normal entire esophagus  Normal entire   stomach  Normal entire duodenum  RECOMMENDATIONS: Discharge home when standard parameters are met  Anti-reflux measures: Raise the head of the bed 4 to 6 inches  Avoid   smoking  Avoid excess coffee, tea or other caffeinated beverages  Avoid   garments that fit tightly through the abdomen  Avoid eating before bed  Resume regular diet as tolerated  Continue taking the following   medications as prescribed: Decrease Pantoprazole to 40mg once a day  Follow-up appointment with endoscopist in 3 months  Follow-up appointment   with family physician on an as needed basis  ESTIMATED BLOOD LOSS: None       PATHOLOGY SPECIMENS: No     PROCEDURE CODES: 62363 - EGD flexible; with biopsy     ICD-9 Codes: 530 10 Esophagitis, unspecified 553 3 Diaphragmatic hernia   without mention of obstruction or gangrene     ICD-10 Codes: K20 9 Esophagitis, unspecified K44 Diaphragmatic hernia     PERFORMED BY: ODALYS Padron  on 04/04/2018  Version 1, electronically signed by ODALYS Cabezas  on 04/04/2018 at   08:03  done

## 2025-02-10 NOTE — DISCHARGE NOTE PROVIDER - CARE PROVIDER_API CALL
Josh Morris  Cardiovascular Disease  [address], [address]  [place] [address]  Phone: [phone]  Fax: [phone]  Follow Up Time:

## 2025-02-10 NOTE — PROGRESS NOTE ADULT - SUBJECTIVE AND OBJECTIVE BOX
Patient is a 39y old  Male who presents with a chief complaint of chest pain (09 Feb 2025 13:33)      INTERVAL HPI/OVERNIGHT EVENTS: seen and examined, denies any CP , scheduled for cardiac cath in am   T(C): 36.6 (02-09-25 @ 12:27), Max: 36.8 (02-08-25 @ 21:30)  HR: 59 (02-09-25 @ 17:29) (59 - 66)  BP: 146/95 (02-09-25 @ 17:29) (137/98 - 172/117)  RR: 16 (02-09-25 @ 12:27) (16 - 18)  SpO2: 99% (02-09-25 @ 12:27) (99% - 100%)  Wt(kg): --  I&O's Summary    09 Feb 2025 07:01  -  09 Feb 2025 20:28  --------------------------------------------------------  IN: 0 mL / OUT: 300 mL / NET: -300 mL        PAST MEDICAL & SURGICAL HISTORY:  Marijuana smoker  sober for 51 days      H/O tonsillitis  and Adenoiditis  hospitalized at [facility]  for 2 days April 2021      MRSA infection  right upper extremity      Epistaxis      History of incision and drainage  right upper extremity secondary to mrsa infection approx 6 years ago          SOCIAL HISTORY  Alcohol:  Tobacco:  Illicit substance use:    FAMILY HISTORY:    REVIEW OF SYSTEMS:  CONSTITUTIONAL: No fever, weight loss, or fatigue  EYES: No eye pain, visual disturbances, or discharge  ENMT:  No difficulty hearing, tinnitus, vertigo; No sinus or throat pain  NECK: No pain or stiffness  RESPIRATORY: No cough, wheezing, chills or hemoptysis; No shortness of breath  CARDIOVASCULAR: No chest pain, palpitations, dizziness, or leg swelling  GASTROINTESTINAL: No abdominal or epigastric pain. No nausea, vomiting, or hematemesis; No diarrhea or constipation. No melena or hematochezia.  GENITOURINARY: No dysuria, frequency, hematuria, or incontinence  NEUROLOGICAL: No headaches, memory loss, loss of strength, numbness, or tremors  SKIN: No itching, burning, rashes, or lesions   LYMPH NODES: No enlarged glands  ENDOCRINE: No heat or cold intolerance; No hair loss  MUSCULOSKELETAL: No joint pain or swelling; No muscle, back, or extremity pain  PSYCHIATRIC: No depression, anxiety, mood swings, or difficulty sleeping  HEME/LYMPH: No easy bruising, or bleeding gums  ALLERY AND IMMUNOLOGIC: No hives or eczema    RADIOLOGY & ADDITIONAL TESTS:    Imaging Personally Reviewed:  [ ] YES  [ ] NO    Consultant(s) Notes Reviewed:  [ ] YES  [ ] NO    PHYSICAL EXAM:  GENERAL: NAD, well-groomed, well-developed  HEAD:  Atraumatic, Normocephalic  EYES: EOMI, PERRLA, conjunctiva and sclera clear  ENMT: No tonsillar erythema, exudates, or enlargement; Moist mucous membranes, Good dentition, No lesions  NECK: Supple, No JVD, Normal thyroid  NERVOUS SYSTEM:  Alert & Oriented X3, Good concentration; Motor Strength 5/5 B/L upper and lower extremities; DTRs 2+ intact and symmetric  CHEST/LUNG: Clear to percussion bilaterally; No rales, rhonchi, wheezing, or rubs  HEART: Regular rate and rhythm; No murmurs, rubs, or gallops  ABDOMEN: Soft, Nontender, Nondistended; Bowel sounds present  EXTREMITIES:  2+ Peripheral Pulses, No clubbing, cyanosis, or edema  LYMPH: No lymphadenopathy noted  SKIN: No rashes or lesions    LABS:                        16.1   5.07  )-----------( 187      ( 09 Feb 2025 06:12 )             48.7     02-09    137  |  103  |  16  ----------------------------<  91  3.8   |  20[L]  |  0.97    Ca    9.5      09 Feb 2025 06:12  Phos  3.2     02-09  Mg     2.10     02-09    TPro  6.9  /  Alb  4.1  /  TBili  0.8  /  DBili  x   /  AST  20  /  ALT  13  /  AlkPhos  57  02-08    PT/INR - ( 08 Feb 2025 09:14 )   PT: 12.3 sec;   INR: 1.06 ratio         PTT - ( 09 Feb 2025 14:50 )  PTT:67.2 sec  Urinalysis Basic - ( 09 Feb 2025 06:12 )    Color: x / Appearance: x / SG: x / pH: x  Gluc: 91 mg/dL / Ketone: x  / Bili: x / Urobili: x   Blood: x / Protein: x / Nitrite: x   Leuk Esterase: x / RBC: x / WBC x   Sq Epi: x / Non Sq Epi: x / Bacteria: x      CAPILLARY BLOOD GLUCOSE            Urinalysis Basic - ( 09 Feb 2025 06:12 )    Color: x / Appearance: x / SG: x / pH: x  Gluc: 91 mg/dL / Ketone: x  / Bili: x / Urobili: x   Blood: x / Protein: x / Nitrite: x   Leuk Esterase: x / RBC: x / WBC x   Sq Epi: x / Non Sq Epi: x / Bacteria: x        MEDICATIONS  (STANDING):  aspirin enteric coated 81 milliGRAM(s) Oral daily  famotidine    Tablet 20 milliGRAM(s) Oral at bedtime  heparin  Infusion.  Unit(s)/Hr (9 mL/Hr) IV Continuous <Continuous>  influenza   Vaccine 0.5 milliLiter(s) IntraMuscular once  losartan 50 milliGRAM(s) Oral daily  metoprolol succinate ER 25 milliGRAM(s) Oral daily    MEDICATIONS  (PRN):  acetaminophen     Tablet .. 650 milliGRAM(s) Oral every 6 hours PRN Mild Pain (1 - 3), Moderate Pain (4 - 6)  heparin   Injectable 4000 Unit(s) IV Push every 6 hours PRN For aPTT less than 40      Care Discussed with Consultants/Other Providers [ ] YES  [ ] NO
CARDIOLOGY FOLLOW UP NOTE - DR. BARAJAS    Patient Name: CHRISTOPHER HARKINS    Date of Service: 02-09-25 @ 13:34    Patient seen and examined  no cp    Subjective:    cv: denies chest pain, dyspnea, palpitations, dizziness  pulmonary: denies cough  GI: denies abdominal pain, nausea, vomiting  vascular/legs: no edema   skin: no rash  ROS: otherwise negative   overnight events:      PHYSICAL EXAM:  T(C): 36.6 (02-09-25 @ 12:27), Max: 36.8 (02-08-25 @ 20:26)  HR: 65 (02-09-25 @ 12:27) (59 - 65)  BP: 151/110 (02-09-25 @ 12:27) (137/98 - 158/113)  RR: 16 (02-09-25 @ 12:27) (16 - 18)  SpO2: 99% (02-09-25 @ 12:27) (99% - 100%)  Wt(kg): --  I&O's Summary    Daily     Daily     Appearance: Normal	  Cardiovascular: Normal S1 S2,RRR, No JVD, No murmurs  Respiratory: Lungs clear to auscultation	  Gastrointestinal:  Soft, Non-tender, + BS	  Extremities: Normal range of motion, No clubbing, cyanosis or edema      Home Medications:      MEDICATIONS  (STANDING):  amLODIPine   Tablet 5 milliGRAM(s) Oral daily  aspirin enteric coated 81 milliGRAM(s) Oral daily  famotidine    Tablet 20 milliGRAM(s) Oral at bedtime  heparin  Infusion.  Unit(s)/Hr (9 mL/Hr) IV Continuous <Continuous>  influenza   Vaccine 0.5 milliLiter(s) IntraMuscular once      TELEMETRY: 	    ECG:  	  RADIOLOGY:   DIAGNOSTIC TESTING:  [ ] Echocardiogram:  [ ] Catheterization:  [ ] Stress Test:    OTHER: 	    LABS:	 	    CARDIAC MARKERS:        Troponin T, High Sensitivity Result: <6 ng/L (02-08 @ 09:14)  Troponin T, High Sensitivity Result: 6 ng/L (02-08 @ 05:35)  Troponin T, High Sensitivity Result: <6 ng/L (02-07 @ 23:56)  Troponin T, High Sensitivity Result: <6 ng/L (02-07 @ 15:53)                                16.1   5.07  )-----------( 187      ( 09 Feb 2025 06:12 )             48.7     02-09    137  |  103  |  16  ----------------------------<  91  3.8   |  20[L]  |  0.97    Ca    9.5      09 Feb 2025 06:12  Phos  3.2     02-09  Mg     2.10     02-09    TPro  6.9  /  Alb  4.1  /  TBili  0.8  /  DBili  x   /  AST  20  /  ALT  13  /  AlkPhos  57  02-08    proBNP:   PT/INR - ( 08 Feb 2025 09:14 )   PT: 12.3 sec;   INR: 1.06 ratio         PTT - ( 09 Feb 2025 06:12 )  PTT:55.6 sec  Lipid Profile:   HgA1c:     Creatinine: 0.97 mg/dL (02-09-25 @ 06:12)  Creatinine: 0.99 mg/dL (02-08-25 @ 09:14)  Creatinine: 1.06 mg/dL (02-08-25 @ 05:35)  Creatinine: 0.86 mg/dL (02-07-25 @ 15:53)            
Patient is a 39y old  Male who presents with a chief complaint of chest pain (10 Feb 2025 18:02)      SUBJECTIVE / OVERNIGHT EVENTS:    Events noted.  CONSTITUTIONAL: No fever,  or fatigue  RESPIRATORY: No cough, wheezing,  No shortness of breath  CARDIOVASCULAR: No chest pain, palpitations, dizziness, or leg swelling  GASTROINTESTINAL: No abdominal or epigastric pain. No nausea, vomiting.  NEUROLOGICAL: No headache    MEDICATIONS  (STANDING):  amLODIPine   Tablet 10 milliGRAM(s) Oral daily  aspirin enteric coated 81 milliGRAM(s) Oral daily  famotidine    Tablet 20 milliGRAM(s) Oral at bedtime  influenza   Vaccine 0.5 milliLiter(s) IntraMuscular once  losartan 50 milliGRAM(s) Oral once  metoprolol succinate ER 25 milliGRAM(s) Oral daily    MEDICATIONS  (PRN):  acetaminophen     Tablet .. 650 milliGRAM(s) Oral every 6 hours PRN Mild Pain (1 - 3), Moderate Pain (4 - 6)        CAPILLARY BLOOD GLUCOSE        I&O's Summary    09 Feb 2025 07:01  -  10 Feb 2025 07:00  --------------------------------------------------------  IN: 0 mL / OUT: 300 mL / NET: -300 mL        T(C): 36.2 (02-10-25 @ 20:29), Max: 36.7 (02-10-25 @ 05:00)  HR: 76 (02-10-25 @ 20:29) (53 - 76)  BP: 144/106 (02-10-25 @ 20:29) (129/100 - 168/120)  RR: 18 (02-10-25 @ 20:29) (11 - 21)  SpO2: 99% (02-10-25 @ 20:29) (95% - 100%)    PHYSICAL EXAM:  GENERAL: NAD  NECK: Supple, No JVD  CHEST/LUNG: Clear to auscultation bilaterally; No wheezing.  HEART: Regular rate and rhythm; No murmurs, rubs, or gallops  ABDOMEN: Soft, Nontender, Nondistended; Bowel sounds present  EXTREMITIES:   No edema  NEUROLOGY: AAO X 3      LABS:                        16.0   4.88  )-----------( 195      ( 10 Feb 2025 07:12 )             46.5     02-10    137  |  105  |  16  ----------------------------<  100[H]  4.1   |  22  |  1.04    Ca    9.1      10 Feb 2025 07:12  Phos  3.2     02-10  Mg     2.10     02-10      PTT - ( 10 Feb 2025 07:12 )  PTT:49.4 sec      Urinalysis Basic - ( 10 Feb 2025 07:12 )    Color: x / Appearance: x / SG: x / pH: x  Gluc: 100 mg/dL / Ketone: x  / Bili: x / Urobili: x   Blood: x / Protein: x / Nitrite: x   Leuk Esterase: x / RBC: x / WBC x   Sq Epi: x / Non Sq Epi: x / Bacteria: x      CAPILLARY BLOOD GLUCOSE            RADIOLOGY & ADDITIONAL TESTS:    Imaging Personally Reviewed:    Consultant(s) Notes Reviewed:      Care Discussed with Consultants/Other Providers:    Fran Cooney MD, CMD, FACP    [address] [place] [address]  Office Tel: [phone]  Cell: [phone]  
CARDIOLOGY FOLLOW UP NOTE - DR. BARAJAS    Patient Name: CHRISTOPHER HARKINS    Date of Service: 02-10-25 @ 13:11    Patient seen and examined  s/p cath     Subjective:    cv: denies chest pain, dyspnea, palpitations, dizziness  pulmonary: denies cough  GI: denies abdominal pain, nausea, vomiting  vascular/legs: no edema   skin: no rash  ROS: otherwise negative   overnight events:      PHYSICAL EXAM:  T(C): 36.5 (02-10-25 @ 12:10), Max: 36.7 (02-09-25 @ 21:10)  HR: 60 (02-10-25 @ 12:10) (53 - 68)  BP: 154/110 (02-10-25 @ 12:10) (129/100 - 172/117)  RR: 17 (02-10-25 @ 12:10) (11 - 21)  SpO2: 98% (02-10-25 @ 12:10) (95% - 100%)  Wt(kg): --  I&O's Summary    09 Feb 2025 07:01  -  10 Feb 2025 07:00  --------------------------------------------------------  IN: 0 mL / OUT: 300 mL / NET: -300 mL      Daily Height in cm: 170.2 (10 Feb 2025 07:45)    Daily     Appearance: Normal	  Cardiovascular: Normal S1 S2,RRR, No JVD, No murmurs  Respiratory: Lungs clear to auscultation	  Gastrointestinal:  Soft, Non-tender, + BS	  Extremities: Normal range of motion, No clubbing, cyanosis or edema      Home Medications:      MEDICATIONS  (STANDING):  amLODIPine   Tablet 10 milliGRAM(s) Oral daily  aspirin enteric coated 81 milliGRAM(s) Oral daily  famotidine    Tablet 20 milliGRAM(s) Oral at bedtime  influenza   Vaccine 0.5 milliLiter(s) IntraMuscular once  losartan 50 milliGRAM(s) Oral daily  metoprolol succinate ER 25 milliGRAM(s) Oral daily      TELEMETRY: 	    ECG:  	  RADIOLOGY:   DIAGNOSTIC TESTING:  [ ] Echocardiogram:  [ ] Catheterization:  [ ] Stress Test:    OTHER: 	    LABS:	 	    CARDIAC MARKERS:        Troponin T, High Sensitivity Result: <6 ng/L (02-08 @ 09:14)  Troponin T, High Sensitivity Result: 6 ng/L (02-08 @ 05:35)  Troponin T, High Sensitivity Result: <6 ng/L (02-07 @ 23:56)  Troponin T, High Sensitivity Result: <6 ng/L (02-07 @ 15:53)                                16.0   4.88  )-----------( 195      ( 10 Feb 2025 07:12 )             46.5     02-10    137  |  105  |  16  ----------------------------<  100[H]  4.1   |  22  |  1.04    Ca    9.1      10 Feb 2025 07:12  Phos  3.2     02-10  Mg     2.10     02-10      proBNP:   PTT - ( 10 Feb 2025 07:12 )  PTT:49.4 sec  Lipid Profile:   HgA1c:     Creatinine: 1.04 mg/dL (02-10-25 @ 07:12)  Creatinine: 0.97 mg/dL (02-09-25 @ 06:12)  Creatinine: 0.99 mg/dL (02-08-25 @ 09:14)  Creatinine: 1.06 mg/dL (02-08-25 @ 05:35)  Creatinine: 0.86 mg/dL (02-07-25 @ 15:53)

## 2025-02-10 NOTE — DISCHARGE NOTE PROVIDER - NSDCQMPCI_CARD_ALL_CORE
No How Severe Are Your Spot(S)?: moderate Have Your Spot(S) Been Treated In The Past?: has not been treated Hpi Title: Evaluation of Skin Lesions

## 2025-02-10 NOTE — DISCHARGE NOTE PROVIDER - NSDCFUADDINST_GEN_ALL_CORE_FT
No strenuous activity x 3 weeks, No heavy lifting x 1 week, May shower but no bath x7 days, Monitor site for any bleeding, infection, redness, pain, swelling, Contact MD if any of these symptoms occur

## 2025-02-10 NOTE — DISCHARGE NOTE PROVIDER - NSDCMRMEDTOKEN_GEN_ALL_CORE_FT
amLODIPine 10 mg oral tablet: 1 tab(s) orally once a day  aspirin 81 mg oral delayed release tablet: 1 tab(s) orally once a day  famotidine 20 mg oral tablet: 1 tab(s) orally once a day (at bedtime)  losartan 50 mg oral tablet: 1 tab(s) orally once a day  metoprolol succinate 25 mg oral tablet, extended release: 1 tab(s) orally once a day   amLODIPine 10 mg oral tablet: 1 tab(s) orally once a day  aspirin 81 mg oral delayed release tablet: 1 tab(s) orally once a day  famotidine 20 mg oral tablet: 1 tab(s) orally once a day (at bedtime)  losartan 100 mg oral tablet: 1 tab(s) orally once a day  metoprolol succinate 25 mg oral tablet, extended release: 1 tab(s) orally once a day

## 2025-02-10 NOTE — CHART NOTE - NSCHARTNOTEFT_GEN_A_CORE
Called by nurse to evaluate patient who wishes to leave the hospital against medical advice. Patient is A&Ox4 and demonstrates full capacity to make his OR her own medical decisions. Pt was informed of their medical conditions, plan of care and the risks of refusing treatment. These risks including stroke, brain bleed, syncope and even death were explained to patient. Explained to patient is symptoms occur to come back to the hospital. Advised patient to follow up with Dr. Morris out patient. Patient expressed full understanding. Patient seen at bedside multiple times. Patient left without signing AMA form. Medications sent to patients pharmacy. Attending Dr. Morris aware.

## 2025-02-10 NOTE — PRE PROCEDURE NOTE - PRE PROCEDURE EVALUATION
Pre Procedural Sedation Evaluation    History and physical reviewed  Medications reviewed  Labs reviewed  EKG reviewed    Anticoagulation: Heparin drip 2/10  Dentures: None  Last PO intake:  2/9 at 2200    Pre-Procedure Physical Assessment  Mental status: Alert and oriented x 3  Level of consciousness: 1  Cardiac assessment: Stable  Pulmonary assessment: Stable  Obstructive sleep apnea: No  Aspiration risk: No  Mallampati score: 2  ASA Classification: 2  Prior Sedative or Anesthesia Experience: No complications  Informed consent by responsible adult: Yes  Based on today's assessment, anesthesia consult requested: No Pre Procedural Sedation Evaluation    History and physical reviewed  Medications reviewed  Labs reviewed  EKG reviewed    Anticoagulation: Heparin drip 2/10  Dentures: None  Last PO intake:  2/10 at 0200    Pre-Procedure Physical Assessment  Mental status: Alert and oriented x 3  Level of consciousness: 1  Cardiac assessment: Stable  Pulmonary assessment: Stable  Obstructive sleep apnea: No  Aspiration risk: No  Mallampati score: 2  ASA Classification: 2  Prior Sedative or Anesthesia Experience: No complications  Informed consent by responsible adult: Yes  Based on today's assessment, anesthesia consult requested: No

## 2025-02-12 LAB
METANEPHRINE, PL: 25.6 PG/ML — SIGNIFICANT CHANGE UP (ref 0–88)
NORMETANEPHRINE, PL: 56.8 PG/ML — SIGNIFICANT CHANGE UP (ref 0–210.1)

## 2025-02-25 ENCOUNTER — INPATIENT (INPATIENT)
Facility: HOSPITAL | Age: 40
LOS: 0 days | Discharge: HOME | End: 2025-02-26
Attending: INTERNAL MEDICINE | Admitting: INTERNAL MEDICINE
Payer: MEDICAID

## 2025-02-25 VITALS
SYSTOLIC BLOOD PRESSURE: 139 MMHG | RESPIRATION RATE: 16 BRPM | DIASTOLIC BLOOD PRESSURE: 97 MMHG | OXYGEN SATURATION: 100 % | WEIGHT: 164.91 LBS | TEMPERATURE: 98 F | HEART RATE: 70 BPM | HEIGHT: 67 IN

## 2025-02-25 DIAGNOSIS — I10 ESSENTIAL (PRIMARY) HYPERTENSION: ICD-10-CM

## 2025-02-25 DIAGNOSIS — M54.30 SCIATICA, UNSPECIFIED SIDE: ICD-10-CM

## 2025-02-25 DIAGNOSIS — R07.9 CHEST PAIN, UNSPECIFIED: ICD-10-CM

## 2025-02-25 DIAGNOSIS — Z98.890 OTHER SPECIFIED POSTPROCEDURAL STATES: Chronic | ICD-10-CM

## 2025-02-25 DIAGNOSIS — Z29.9 ENCOUNTER FOR PROPHYLACTIC MEASURES, UNSPECIFIED: ICD-10-CM

## 2025-02-25 DIAGNOSIS — Z79.899 OTHER LONG TERM (CURRENT) DRUG THERAPY: ICD-10-CM

## 2025-02-25 PROBLEM — R04.0 EPISTAXIS: Chronic | Status: ACTIVE | Noted: 2025-02-07

## 2025-02-25 LAB
ADD ON TEST-SPECIMEN IN LAB: SIGNIFICANT CHANGE UP
APTT BLD: 31.1 SEC — SIGNIFICANT CHANGE UP (ref 24.5–35.6)
BASOPHILS # BLD AUTO: 0.03 K/UL — SIGNIFICANT CHANGE UP (ref 0–0.2)
BASOPHILS NFR BLD AUTO: 0.6 % — SIGNIFICANT CHANGE UP (ref 0–2)
CK MB BLD-MCNC: 1.3 % — SIGNIFICANT CHANGE UP (ref 0–2.5)
CK MB BLD-MCNC: <1.4 % — SIGNIFICANT CHANGE UP (ref 0–2.5)
CK MB CFR SERPL CALC: 1.1 NG/ML — SIGNIFICANT CHANGE UP
CK MB CFR SERPL CALC: <1 NG/ML — SIGNIFICANT CHANGE UP
CK SERPL-CCNC: 72 U/L — SIGNIFICANT CHANGE UP (ref 30–200)
CK SERPL-CCNC: 86 U/L — SIGNIFICANT CHANGE UP (ref 30–200)
EOSINOPHIL # BLD AUTO: 0.1 K/UL — SIGNIFICANT CHANGE UP (ref 0–0.5)
EOSINOPHIL NFR BLD AUTO: 2 % — SIGNIFICANT CHANGE UP (ref 0–6)
GAS PNL BLDV: SIGNIFICANT CHANGE UP
HCT VFR BLD CALC: 41.3 % — SIGNIFICANT CHANGE UP (ref 39–50)
HGB BLD-MCNC: 14.4 G/DL — SIGNIFICANT CHANGE UP (ref 13–17)
IANC: 2.51 K/UL — SIGNIFICANT CHANGE UP (ref 1.8–7.4)
IMM GRANULOCYTES NFR BLD AUTO: 0.2 % — SIGNIFICANT CHANGE UP (ref 0–0.9)
INR BLD: 1.1 RATIO — SIGNIFICANT CHANGE UP (ref 0.85–1.16)
LYMPHOCYTES # BLD AUTO: 1.82 K/UL — SIGNIFICANT CHANGE UP (ref 1–3.3)
LYMPHOCYTES # BLD AUTO: 36.8 % — SIGNIFICANT CHANGE UP (ref 13–44)
MCHC RBC-ENTMCNC: 28.2 PG — SIGNIFICANT CHANGE UP (ref 27–34)
MCHC RBC-ENTMCNC: 34.9 G/DL — SIGNIFICANT CHANGE UP (ref 32–36)
MCV RBC AUTO: 81 FL — SIGNIFICANT CHANGE UP (ref 80–100)
MONOCYTES # BLD AUTO: 0.47 K/UL — SIGNIFICANT CHANGE UP (ref 0–0.9)
MONOCYTES NFR BLD AUTO: 9.5 % — SIGNIFICANT CHANGE UP (ref 2–14)
NEUTROPHILS # BLD AUTO: 2.51 K/UL — SIGNIFICANT CHANGE UP (ref 1.8–7.4)
NEUTROPHILS NFR BLD AUTO: 50.9 % — SIGNIFICANT CHANGE UP (ref 43–77)
NRBC # BLD AUTO: 0 K/UL — SIGNIFICANT CHANGE UP (ref 0–0)
NRBC # FLD: 0 K/UL — SIGNIFICANT CHANGE UP (ref 0–0)
NRBC BLD AUTO-RTO: 0 /100 WBCS — SIGNIFICANT CHANGE UP (ref 0–0)
PCP SPEC-MCNC: SIGNIFICANT CHANGE UP
PLATELET # BLD AUTO: 232 K/UL — SIGNIFICANT CHANGE UP (ref 150–400)
PROTHROM AB SERPL-ACNC: 12.7 SEC — SIGNIFICANT CHANGE UP (ref 9.9–13.4)
RBC # BLD: 5.1 M/UL — SIGNIFICANT CHANGE UP (ref 4.2–5.8)
RBC # FLD: 13.3 % — SIGNIFICANT CHANGE UP (ref 10.3–14.5)
TROPONIN T, HIGH SENSITIVITY RESULT: <6 NG/L — SIGNIFICANT CHANGE UP
WBC # BLD: 4.94 K/UL — SIGNIFICANT CHANGE UP (ref 3.8–10.5)
WBC # FLD AUTO: 4.94 K/UL — SIGNIFICANT CHANGE UP (ref 3.8–10.5)

## 2025-02-25 PROCEDURE — 99223 1ST HOSP IP/OBS HIGH 75: CPT

## 2025-02-25 PROCEDURE — 99285 EMERGENCY DEPT VISIT HI MDM: CPT | Mod: 25

## 2025-02-25 PROCEDURE — 71045 X-RAY EXAM CHEST 1 VIEW: CPT | Mod: 26

## 2025-02-25 RX ORDER — ENOXAPARIN SODIUM 100 MG/ML
40 INJECTION SUBCUTANEOUS EVERY 24 HOURS
Refills: 0 | Status: DISCONTINUED | OUTPATIENT
Start: 2025-02-25 | End: 2025-02-26

## 2025-02-25 RX ORDER — IBUPROFEN 200 MG
600 TABLET ORAL EVERY 6 HOURS
Refills: 0 | Status: DISCONTINUED | OUTPATIENT
Start: 2025-02-25 | End: 2025-02-26

## 2025-02-25 RX ORDER — ACETAMINOPHEN 500 MG/5ML
1000 LIQUID (ML) ORAL ONCE
Refills: 0 | Status: COMPLETED | OUTPATIENT
Start: 2025-02-25 | End: 2025-02-25

## 2025-02-25 RX ORDER — INFLUENZA A VIRUS A/IDAHO/07/2018 (H1N1) ANTIGEN (MDCK CELL DERIVED, PROPIOLACTONE INACTIVATED, INFLUENZA A VIRUS A/INDIANA/08/2018 (H3N2) ANTIGEN (MDCK CELL DERIVED, PROPIOLACTONE INACTIVATED), INFLUENZA B VIRUS B/SINGAPORE/INFTT-16-0610/2016 ANTIGEN (MDCK CELL DERIVED, PROPIOLACTONE INACTIVATED), INFLUENZA B VIRUS B/IOWA/06/2017 ANTIGEN (MDCK CELL DERIVED, PROPIOLACTONE INACTIVATED) 15; 15; 15; 15 UG/.5ML; UG/.5ML; UG/.5ML; UG/.5ML
0.5 INJECTION, SUSPENSION INTRAMUSCULAR ONCE
Refills: 0 | Status: DISCONTINUED | OUTPATIENT
Start: 2025-02-25 | End: 2025-02-26

## 2025-02-25 RX ORDER — LIDOCAINE HYDROCHLORIDE 20 MG/ML
1 JELLY TOPICAL DAILY
Refills: 0 | Status: DISCONTINUED | OUTPATIENT
Start: 2025-02-25 | End: 2025-02-26

## 2025-02-25 RX ORDER — ASPIRIN 325 MG
81 TABLET ORAL DAILY
Refills: 0 | Status: DISCONTINUED | OUTPATIENT
Start: 2025-02-25 | End: 2025-02-26

## 2025-02-25 RX ORDER — LOSARTAN POTASSIUM 100 MG/1
100 TABLET, FILM COATED ORAL DAILY
Refills: 0 | Status: DISCONTINUED | OUTPATIENT
Start: 2025-02-25 | End: 2025-02-26

## 2025-02-25 RX ORDER — AMLODIPINE BESYLATE 10 MG/1
5 TABLET ORAL
Refills: 0 | Status: DISCONTINUED | OUTPATIENT
Start: 2025-02-25 | End: 2025-02-26

## 2025-02-25 RX ORDER — METOPROLOL SUCCINATE 50 MG/1
25 TABLET, EXTENDED RELEASE ORAL DAILY
Refills: 0 | Status: DISCONTINUED | OUTPATIENT
Start: 2025-02-25 | End: 2025-02-26

## 2025-02-25 RX ADMIN — LIDOCAINE HYDROCHLORIDE 1 PATCH: 20 JELLY TOPICAL at 17:54

## 2025-02-25 RX ADMIN — Medication 600 MILLIGRAM(S): at 17:54

## 2025-02-25 RX ADMIN — LIDOCAINE HYDROCHLORIDE 1 PATCH: 20 JELLY TOPICAL at 19:49

## 2025-02-25 RX ADMIN — Medication 400 MILLIGRAM(S): at 07:16

## 2025-02-25 NOTE — ED PROVIDER NOTE - PHYSICAL EXAMINATION
PHYSICAL EXAM:  CONSTITUTIONAL: anxious appearing   HEAD: Atraumatic  CARDIAC: Normal rate, regular rhythm. +S1/S2. No murmurs, rubs or gallops.  RESPIRATORY: Breathing unlabored. Breath sounds clear and equal bilaterally.  ABDOMEN:  Soft, nontender, nondistended. No rebound tenderness or guarding.  NEUROLOGICAL: Alert and oriented, no focal deficits, no motor or sensory deficits. CN2-12 intact. Sensation intact x4 extremities.  SKIN: Skin warm and dry. No evidence of rashes or lesions.

## 2025-02-25 NOTE — ED PROVIDER NOTE - PROGRESS NOTE DETAILS
MD Nolasco: Rpt EKG performed. Similar to prior. Discussed with CCU and Dr. Valdovinos - determined no acute intervention at this time. Discuss case w/ patient's private interventionalist Dr. Otilio Morris. Agrees patient likely does not require acute intervention, more likely hypertensive urgency. Recommending admission to their (him/Josh Morris's) service for BP control and further hypertensive work up pending labs. Emory Guevara MD PGY3: labs nonactionable. as per discussion with dr ramirez, to admit for further workup and management.

## 2025-02-25 NOTE — CHART NOTE - NSCHARTNOTEFT_GEN_A_CORE
39-year-old male, past medical history of hypertension, presents to ED complaining of sudden onset chest pain 1 hr pta.  blood pressure was noted to be 190s/110s. Pt was given 324mg ASA and Nitro x 3 per EMS,  EKG is NSR w/ ST elevations in anterior leads and depressions/TWIs in inferior leads similar to prior, likely LVH.  had [facility] 2/10/25 with no disease.  Case discussed with cath interventionalist Dr. Valdovinos with no intervention at this time. Private cardiologist Josh Morirs to follow inpatient.

## 2025-02-25 NOTE — ED ADULT NURSE NOTE - NSICDXPASTSURGICALHX_GEN_ALL_CORE_FT
To ED with c/o 30 minute duration dizziness, lightheaded, feels like room is spinning, sweaty. Vomited x 3 after these s/s started. No pain. D/w Dr Gee. Pt arrives wearing mask.   PAST SURGICAL HISTORY:  History of incision and drainage right upper extremity secondary to mrsa infection approx 6 years ago

## 2025-02-25 NOTE — H&P ADULT - NSHPLABSRESULTS_GEN_ALL_CORE
14.4   4.94  )-----------( 232      ( 25 Feb 2025 06:00 )             41.3     02-25    138  |  102  |  18  ----------------------------<  93  3.8   |  21[L]  |  1.03    Ca    8.8      25 Feb 2025 06:00  Phos  2.7     02-25  Mg     2.00     02-25    TPro  6.5  /  Alb  3.9  /  TBili  0.3  /  DBili  x   /  AST  17  /  ALT  15  /  AlkPhos  55  02-25    CAPILLARY BLOOD GLUCOSE        PT/INR - ( 25 Feb 2025 06:00 )   PT: 12.7 sec;   INR: 1.10 ratio         PTT - ( 25 Feb 2025 06:00 )  PTT:31.1 sec  Urinalysis Basic - ( 25 Feb 2025 06:00 )    Color: x / Appearance: x / SG: x / pH: x  Gluc: 93 mg/dL / Ketone: x  / Bili: x / Urobili: x   Blood: x / Protein: x / Nitrite: x   Leuk Esterase: x / RBC: x / WBC x   Sq Epi: x / Non Sq Epi: x / Bacteria: x      Vital Signs Last 24 Hrs  T(C): 36.6 (25 Feb 2025 20:50), Max: 36.7 (25 Feb 2025 05:43)  T(F): 97.8 (25 Feb 2025 20:50), Max: 98.1 (25 Feb 2025 05:43)  HR: 71 (25 Feb 2025 20:50) (55 - 84)  BP: 147/89 (25 Feb 2025 20:50) (118/87 - 149/99)  BP(mean): 115 (25 Feb 2025 06:05) (115 - 115)  RR: 18 (25 Feb 2025 20:50) (16 - 18)  SpO2: 98% (25 Feb 2025 20:50) (98% - 100%)    Parameters below as of 25 Feb 2025 20:50  Patient On (Oxygen Delivery Method): room air

## 2025-02-25 NOTE — ED ADULT NURSE NOTE - NSICDXPASTMEDICALHX_GEN_ALL_CORE_FT
PAST MEDICAL HISTORY:  Epistaxis     H/O tonsillitis and Adenoiditis  hospitalized at [facility]  for 2 days April 2021    Marijuana smoker sober for 51 days    MRSA infection right upper extremity

## 2025-02-25 NOTE — CONSULT NOTE ADULT - ASSESSMENT
39-year-old male history of hypertension recently admitted for hypertensive urgency with ecg chnages now  presenting with chest pain/ htn urgency       #Chest pain.   -in the setting of HTN urgency   -unchanged ecg - abnormality secondary to LVH  -trop thus far negative  -recent cath from 2/10/25 - with no severe CAD  -recent 2/8/25 with mild global lv dysfxn, likely sec to htn   -optimize BP  -Resume asa  -pain now msk/ reproducible  -can check limited echo to r/o effusion/pericarditis     #HTN urgency  -resume amlo 10 qd  -resume toprol xl 25 qd  -resime losartan  100 qd  -echo as above  -had recent secondary HTN blood work done  -CHECK renal SONO (left last admission AMA)      dvt ppx

## 2025-02-25 NOTE — ED ADULT NURSE NOTE - SUICIDE SCREENING QUESTION 1
Patient seen today at [facility] while participating in [address]    They are also receiving care and oversight by Collaborating Physician, Dr. Barb Morse   10/20/1939 MRN [id number]  [facility] Admission 23  Last Hospital Discharge 23 PCP Rene Ramirez MD  HPI:  Dennis Solano is a 80year old female w/ PMHx significant for osteoarthritis, osteoporosis, hyperlipidemia, CKD 2-3, unerlying cardiomyopathy w/ EF 20-30%. She presented to the ED after falling and landing on her left hip. Xray showed displaced intertrochanteric fracture of left femur w/ avulsion of the lesser trochanter. Cardiology and Ortho surgery consulted. She underwent L hip/femoral nailing on  w/ Dr. Hiral Rajput. Hospital course complicated by NSTEMI. She underwent LHC on . Also found to have new onset Afib. Started on metoprolol, losartan, and plavix. Hospital course complicated by low BPs - hold parameters added to cardiac meds. Therapy eval recommended MILTON. Patient was stabilized and discharged to [facility] for further monitoring and rehabilitation. Hospital Discharge Diagnoses:  - Left hip IT fracture 2/2 mechanical fall  - NSTEMI  -Hx ischemic cardiomyopathy  - Hypotension  - New Afib  - Hx CKD  - HLD  Chief Complaint at Visit:  Patient presents with: Follow - Up: Intertrochanteric fracture L femur s/p nailing; NSTEMI, new Afib, weakness  ALLERGIES  She is allergic to bacitracin, codeine, erythromycin, iodine (topical), milk protein extract, neomycin-polymyxin-dexameth, shellfish-derived products, sulfasalazine, sulfa antibiotics, aspirin, gold salts, lipitor [atorvastatin], silver, and sulfamethoxazole w/trimethoprim. HISTORY:  She has a past medical history of Calculus of kidney, Cancer ([facility] [address] Cataract, Hyperlipidemia, and Polio. She has no past surgical history on file.     CODE STATUS VERIFIED: DNR/Select    SUBJECTIVE/ ROS:  Patient seen today in bed, in no acute distress. Sts she is doing ok. Mild pain to L hip. Pt sts that her LLE remains weak but she believes she is doing better in therapy. She reports being bale to ambulate w/ a walker and PT. Tolerating diet. Rash resolved. Tentative discharge plan for 10/6. Pt requires 24/7 assistance and will likely transition to LTC at facility of her choice. Sts she was talking to her son who helps her make arrangements.    REVIEW OF SYSTEMS:  GENERAL HEALTH:feels well otherwise  SKIN: denies any unusual skin lesions or rashes  WOUNDS: +L hip surgical incisions   EYES:no visual complaints  HENT: denies nasal congestion, sinus pain or sore throat  RESPIRATORY:denies shortness of breath, wheezing or cough  CARDIOVASCULAR: no complaints; denies chest pain, palpitations  GI: no complaints; denies abdominal discomfort, nausea, or vomiting; denies constipation  :no urinary complaints; no dysuria, urgency or frequency  MUSCULOSKELETAL: L hip pain s/p surgery - improved w/ scheduled tylenol; no other complaints; denies calf pain b/l  NEURO:no sensory or motor complaints; +intermittent vertigo sx  PSYCHE: no complaints of depression or anxiety  HEMATOLOGY:no history of anemia, OTONIEL or B12 deficiency  ASSESSMENT/ PHYSICAL EXAM:  GENERAL HEALTH: well developed, appears well nourished, in no apparent distress  LINES, TUBES, DRAINS: none  SKIN: no rashes, no suspicious lesions  WOUND: L hip surgical incision sites x2 QUAN, healing and approximated, no redness/edema or drainage  EYES: conjunctiva normal, no drainage from eyes  HENT: normocephalic; normal nose, no nasal drainage, mucous membranes pink, moist  RESPIRATORY: clear to auscultation; respirations regular, unlabored  CARDIOVASCULAR: S1, S2 normal, RRR  ABDOMEN: active BS+, soft, non-distended; no visible masses; non-tender, no guarding  : deferred  MUSCULOSKELETAL: weakness r/t recent hospitalization/diagnoses/sequelae; will undergo therapies to rehab and improve strength, endurance and independence with ADLs as able/tolerated  EXTREMITIES/VASCULAR: mild edema LLE; pedal pulses +2 b/l; toes warm/pink b/l; no calf pain/swelling/redness/heat b/l  NEUROLOGIC: intact; no sensorimotor deficit; no facial asymmetry, unilateral weakness, or pronator drift +left BPPV assessment per physical therapy  PSYCHIATRIC: alert and oriented x 3; affect appropriate  MEDICAL DECISION MAKING and PLAN OF CARE:  Continue therapies.   BPPV  - +left bppv per therapy eval  - Therapy unable to perform tx exercises d/t L hip surgery  - Meclizine 12.5mg daily prn  Left hip IT fracture 2/2 mechanical fall  - S/p nailing on 9/1 w/ Dr. Anali Aguirre  - WBAT  - Tylenol 650mg Q6H  - Plavix sufficient for vte prophylaxis per ortho  - TEDs compression b/l during the day  - Ice pack application prn for pain relief - no more than 20min intervals  - PT/OT therapies as indicated  - F/u w/ Ortho- appt 9/22 per patient report  NSTEMI  Hx ischemic cardiomyopathy  - Echo 25-30%  - S/p LHC 8/31 - per notes, multivessel CAD but not CABG candidate  - Metoprolol succinate 25mg daily - hold for SBP < 100 or HR < 60  - Losartan 12.5mg nightly - hold for SBP < 100 or HR < 60  - Plavix 75mg daily  New Afib  - Metoprolol as above  - Plavix as above  Anemia  - Hgb mostly upper 7's/low 8's during hospitalization  - Suspect component of blood loss 2/2 surgery  - CBC weekly, monitor for trends  - Monitor for s/sx active bleeding  - Check iron, folate, b12, ferritin  Hx CKD  - Avoid nephrotoxins as able  - CMP weekly, monitor trends  HLD  - Intolerant to statin  - Zetia 10mg nightly  Physical Deconditioning/Weakness; at risk for falling  - Fall Precautions  - PT/OT/ST to evaluate and treat  - MILTON team to establish care plan meeting with patient and POA/family as appropriate  - MILTON team/ & discharge planner to assist with establishing safe discharge plan for next level of care  DVT Prophylaxis  - Encourage exercise and participation with therapy as much as able  - Plavix, MARY compression stockings  GI Prophylaxis  Pain Management  - Offer to pre-medicate 30-60 min prior to therapy  - Physiatry evaluation with management appreciated  - Tylenol as above  Bowel Management Regimen/Constipation  - Colace 100mg BID  - Miralax 17g daily prn  Vitamins/supplements as r/t deficiencies  MILTON RD to follow while in rehab; supplementation/diet as per MILTON RD  May continue home supplements:  *This patient will undergo PT/OT/SLP evaluation with treatment as needed. *Skilled nursing care including assistance with ADLs and medication administration. Labs:  - CBC, CMP weekly  Discharge Planning:  - Discharge Date: ? 10/6.    - Disposition: Pt needs 24/7 care. ? LTC  Follow-Up:  - PCP within 1-2 weeks following MILTON discharge. - Specialists as recommended:  ---> Cardiology (Dr. Amandeep Cantu) - requested RN to f/u on scheduling appt  ---> Ortho (Dr. Wendy Tamayo) -saw on 9/22 per patient. Next f/u 6 months  Note to patient: The [facility] makes medical notes like these available to patients in the interest of transparency. However, this is a medical document intended as peer to peer communication. It is written in medical language and may contain abbreviations or verbiage that are unfamiliar. It may appear blunt or direct. Medical documents are intended to carry relevant information, facts as evident, and the clinical opinion of the practitioner who signs the document. >30 minute including face-to-face visit, counseling the patient, reviewing records, and/or coordinating care. No

## 2025-02-25 NOTE — ED PROVIDER NOTE - OBJECTIVE STATEMENT
39-year-old male history of hypertension recently admitted for hypertensive urgency presenting with chest pain–Per EMS EKG showing STEMI.  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  Patient went home, checked his blood pressure noticed it was in the 190s systolic, took his as needed amlodipine as prescribed.  Patient states that he then called EMS.  Per EMS received 324 of aspirin and 3 times nitro spray with improvement of blood pressure and partial relief of chest pain.  Patient still endorsing moderate chest pain.  Denies shortness of breath abdominal pain nausea vomiting fever cough.

## 2025-02-25 NOTE — H&P ADULT - PROBLEM SELECTOR PLAN 1
in the setting of HTN urgency   -unchanged ecg - abnormality secondary to LVH  -trop  negative  -recent cath from 2/10/25 - with no  CAD  -recent 2/8/25 TTE with mild global lv dysfxn, likely sec to htn; no clinical evid of fluid overload   -per cards, can check limited echo to r/o effusion/pericarditis   -optimize BP  -Resume asa  -lipid panel wnl

## 2025-02-25 NOTE — PATIENT PROFILE ADULT - FALL HARM RISK - HARM RISK INTERVENTIONS

## 2025-02-25 NOTE — ED ADULT NURSE NOTE - OBJECTIVE STATEMENT
Pt received to [facility]. Pt is a 39 year old male with Hx of HTN. Pt brought to ED by EMS for chest pain onset x2 hours. Pt states he was arguing w/ significant other and he started having L sided chest pain. Per EMS they gave 324mg of aspirin and x3 nitro doses. Pt states he currently has slight relief of chest pain. denies, SOB, headache, dizziness, abdominal pain, n/v/d, urinary symptoms, fevers/chills, numbness/tingling. Pt is A&Ox4, ambulatory without assistance at baseline. speaking clearly in full sentences. breathing is even and unlabored. abdomen is soft, nontender, nondistended. no edema noted. skin is intact. spontaneous movement of all extremities. Placed on cardiac monitor (sinus sandra) and continuous pulse oximetry. EKG in chart. VS as noted in flowsheet. L Hand 20 in place by EMS. R AC 20g IV placed, +blood return, flushes without difficulty. labs collected and sent. awaiting imaging. comfort measures provided. stretcher set in lowest position, call bell within reach, safety maintained.

## 2025-02-25 NOTE — H&P ADULT - PROBLEM SELECTOR PLAN 2
-resume amlo 5 qd prn 130/80  -resume toprol xl 25 qd  -resume losartan  100 qd  - expand coverage if needed   -echo as above  -had recent secondary HTN blood work done  -CHECK renal SONO (left last admission AMA) -resume amlo 5 qd prn 130/80  -resume toprol xl 25 qd  -resume losartan  100 qd  - expand coverage if needed   -echo as above  -had recent secondary HTN blood work done  -CHECK renal SONO (left last admission AMA)  -urine tox neg save for THC

## 2025-02-25 NOTE — H&P ADULT - HISTORY OF PRESENT ILLNESS
39-year-old male recently diagnosed htn  admitted 2 weeks ago for hypertensive urgency with ecg changes,  underwent cath showing no evidence of CAD, discharged (AMA) on multiple htn agents including losartan, toprol, and prn norvasc without completing  secondary htn workup (blood work done but no renal sono). Notes home BP device recording poorly controlled  HTN despite reported compliance over last week. At time of my exam, pt with improved BP and cp resolution. ekg 56 bpm, lvh. trop <6. cxr clear.

## 2025-02-25 NOTE — H&P ADULT - NSICDXPASTMEDICALHX_GEN_ALL_CORE_FT
PAST MEDICAL HISTORY:  Epistaxis     H/O tonsillitis and Adenoiditis  hospitalized at [facility]  for 2 days April 2021    Marijuana smoker sober for 51 days    MRSA infection right upper extremity

## 2025-02-25 NOTE — CONSULT NOTE ADULT - SUBJECTIVE AND OBJECTIVE BOX
CARDIOLOGY CONSULT - Dr. Morris         HPI:  39-year-old male history of hypertension recently admitted for hypertensive urgency with ecg chnages now  presenting with chest pain–Per EMS/ER EKG showing STEMI.  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 pt reports to have a stressful night with argument with his friend and boss.  Patient went home, checked his blood pressure noticed it was 191/118 , took his as needed amlodipine as prescribed.  Patient states that he then called EMS.  Per EMS received 324 of aspirin and 3 times nitro spray with improvement of blood pressure and partial relief of chest pain.    pt is know from recent admissions   -2/10/25 -- s/p cath with no severe CAD  -2/8/25 echo with mild global lv dysfxn, likely sec to htn   -reports to be compliant with meds from dc   -on exam now mild , reproducible  ROS otherwise negative     PAST MEDICAL & SURGICAL HISTORY:  Marijuana smoker  sober for 51 days      H/O tonsillitis  and Adenoiditis  hospitalized at [facility]  for 2 days April 2021      MRSA infection  right upper extremity      Epistaxis      History of incision and drainage  right upper extremity secondary to mrsa infection approx 6 years ago              PREVIOUS DIAGNOSTIC TESTING:    [ ] Echocardiogram:  < from: TTE W or WO Ultrasound Enhancing Agent (02.08.25 @ 12:55) >     CONCLUSIONS:      1. Left ventricular cavity isnormal in size. Left ventricular systolic function is mildly decreased. Global left ventricular hypokinesis.   2. There is mild (grade 1) left ventricular diastolic dysfunction.   3. Normal right ventricular cavity size, with normal wall thickness, and borderline reduced right ventricular systolic function.   4. Left atrium is normal in size.   5. No significant valvular disease.   6. No pericardial effusion seen.   7. Normal pulmonary artery pressure.   8. No prior echocardiogram is available for comparison.      < end of copied text >    [ ]  Catheterization:  < from: Cardiac Catheterization (02.10.25 @ 08:57) >  Diagnostic Conclusions:     No angiographic evidence of coronary artery disease.    Recommendations:     Continue age appropriate coronary artery disease risk factor  optimization according to the most recent ACC/AHA guidelines.    < end of copied text >    [ ] Stress Test:  	    MEDICATIONS:  Home Medications:  amLODIPine 10 mg oral tablet: 1 tab(s) orally once a day (10 Feb 2025 18:04)  aspirin 81 mg oral delayed release tablet: 1 tab(s) orally once a day (10 Feb 2025 18:04)  famotidine 20 mg oral tablet: 1 tab(s) orally once a day (at bedtime) (10 Feb 2025 18:04)  metoprolol succinate 25 mg oral tablet, extended release: 1 tab(s) orally once a day (10 Feb 2025 18:04)      MEDICATIONS  (STANDING):      FAMILY HISTORY:  Family history of essential hypertension (Mother)        SOCIAL HISTORY:    [ ]former smoker     Allergies    morphine (Rash)  Bactrim (Hives)    Intolerances    	    REVIEW OF SYSTEMS:  CONSTITUTIONAL: No fever, weight loss, or fatigue  EYES: No eye pain, visual disturbances, or discharge  ENMT:  No difficulty hearing, tinnitus, vertigo; No sinus or throat pain  NECK: No pain or stiffness  RESPIRATORY: No cough, wheezing, chills or hemoptysis; No Shortness of Breath  CARDIOVASCULAR: N see hpi   GASTROINTESTINAL: No abdominal or epigastric pain. No nausea, vomiting, or hematemesis; No diarrhea or constipation. No melena or hematochezia.  GENITOURINARY: No dysuria, frequency, hematuria, or incontinence  NEUROLOGICAL: No headaches, memory loss, loss of strength, numbness, or tremors  SKIN: No itching, burning, rashes, or lesions   	    [ x] All others negative	  [ ] Unable to obtain    PHYSICAL EXAM:  T(C): 36.7 (02-25-25 @ 06:05), Max: 36.7 (02-25-25 @ 05:43)  HR: 55 (02-25-25 @ 08:32) (55 - 70)  BP: 118/87 (02-25-25 @ 08:32) (118/87 - 142/103)  RR: 18 (02-25-25 @ 08:32) (16 - 18)  SpO2: 99% (02-25-25 @ 08:32) (99% - 100%)  Wt(kg): --  I&O's Summary      Appearance: Normal	  Psychiatry: A & O x 3, Mood & affect appropriate  HEENT:   Normal oral mucosa, PERRL, EOMI	  Lymphatic: No lymphadenopathy  Cardiovascular: Normal S1 S2,RRR, No JVD, No murmurs  Respiratory: Lungs clear to auscultation	  Gastrointestinal:  Soft, Non-tender, + BS	  Skin: No rashes, No ecchymoses, No cyanosis	  Neurologic: Non-focal  Extremities: Normal range of motion, No clubbing, cyanosis or edema  Vascular: Peripheral pulses palpable 2+ bilaterally    TELEMETRY: 	    ECG:  	SB hr 56 LVH   twi to inferior leads -- unchanged from prior ECG  RADIOLOGY:    < from: Xray Chest 1 View- PORTABLE-Urgent (02.25.25 @ 06:59) >  FINDINGS:    The heart is normal in size.  The lungs are clear.  There is no pneumothorax or pleural effusion.  No acute bony abnormality.    IMPRESSION:  Clear lungs.    --- End of Report ---      < end of copied text >    OTHER: 	  	  LABS:	 	    CARDIAC MARKERS:  Troponin T, High Sensitivity Result: <6 ng/L (02-25 @ 06:00)                                  14.4   4.94  )-----------( 232      ( 25 Feb 2025 06:00 )             41.3     02-25    138  |  102  |  18  ----------------------------<  93  3.8   |  21[L]  |  1.03    Ca    8.8      25 Feb 2025 06:00  Phos  2.7     02-25  Mg     2.00     02-25    TPro  6.5  /  Alb  3.9  /  TBili  0.3  /  DBili  x   /  AST  17  /  ALT  15  /  AlkPhos  55  02-25    PT/INR - ( 25 Feb 2025 06:00 )   PT: 12.7 sec;   INR: 1.10 ratio         PTT - ( 25 Feb 2025 06:00 )  PTT:31.1 sec  proBNP:   Lipid Profile:   HgA1c:   TSH:

## 2025-02-25 NOTE — CONSULT NOTE ADULT - TIME BILLING
agree with above  39-year-old male history of hypertension recently admitted for hypertensive urgency with ecg chnages now  presenting with chest pain/ htn urgency       #Chest pain.   -in the setting of HTN urgency   -unchanged ecg - abnormality secondary to LVH  -trop thus far negative  -recent cath from 2/10/25 - with no severe CAD  -recent 2/8/25 with mild global lv dysfxn, likely sec to htn   -optimize BP  -Resume asa  -pain now msk/ reproducible  -can check limited echo to r/o effusion/pericarditis     #HTN urgency  -resume amlo 10 qd  -resume toprol xl 25 qd  -resime losartan  100 qd  -echo as above  -had recent secondary HTN blood work done  -CHECK renal SONO (left last admission AMA)      dvt ppx

## 2025-02-25 NOTE — ED ADULT TRIAGE NOTE - CHIEF COMPLAINT QUOTE
called in for STEMI Notification. Pt c/o substernal chest pain 1 hr prior to arrival. 3nitro sprays and 324 aspirin given by EMS. hx of HTN.

## 2025-02-25 NOTE — PATIENT PROFILE ADULT - NSPROGENOTHERPROVIDER_GEN_A_NUR
Hpi Title: Evaluation of Skin Lesions
Have Your Spot(S) Been Treated In The Past?: has not been treated
outpatient care

## 2025-02-26 ENCOUNTER — RESULT REVIEW (OUTPATIENT)
Age: 40
End: 2025-02-26

## 2025-02-26 ENCOUNTER — TRANSCRIPTION ENCOUNTER (OUTPATIENT)
Age: 40
End: 2025-02-26

## 2025-02-26 VITALS — SYSTOLIC BLOOD PRESSURE: 149 MMHG | DIASTOLIC BLOOD PRESSURE: 100 MMHG | HEART RATE: 72 BPM

## 2025-02-26 PROCEDURE — 93975 VASCULAR STUDY: CPT | Mod: 26

## 2025-02-26 PROCEDURE — 93308 TTE F-UP OR LMTD: CPT | Mod: 26,GC

## 2025-02-26 RX ADMIN — Medication 81 MILLIGRAM(S): at 11:25

## 2025-02-26 RX ADMIN — AMLODIPINE BESYLATE 5 MILLIGRAM(S): 10 TABLET ORAL at 17:45

## 2025-02-26 RX ADMIN — METOPROLOL SUCCINATE 25 MILLIGRAM(S): 50 TABLET, EXTENDED RELEASE ORAL at 05:57

## 2025-02-26 RX ADMIN — LOSARTAN POTASSIUM 100 MILLIGRAM(S): 100 TABLET, FILM COATED ORAL at 05:50

## 2025-02-26 RX ADMIN — LIDOCAINE HYDROCHLORIDE 1 PATCH: 20 JELLY TOPICAL at 11:25

## 2025-02-26 RX ADMIN — LIDOCAINE HYDROCHLORIDE 1 PATCH: 20 JELLY TOPICAL at 05:49

## 2025-02-26 NOTE — DISCHARGE NOTE PROVIDER - HOSPITAL COURSE
39-year-old male recently diagnosed htn  admitted 2 weeks ago for hypertensive urgency with ecg changes,  underwent cath showing no evidence of CAD, discharged (AMA) on multiple htn agents including losartan, toprol, and prn norvasc without completing  secondary htn workup (blood work done but no renal sono). Notes home BP device recording poorly controlled  HTN despite reported compliance over last week. At time of my exam, pt with improved BP and cp resolution. ekg 56 bpm, lvh. trop <6. cxr clear.     Patient was admitted for chest pain due to hypertensive urgency. EKG unchanged from previous EKG. Troponins neg x 2. Recent cath 2/10/25 unremarkable. TTE unremarkable. Renal sonogram was negative.    On 2/26/25 this case was reviewed with Dr. Morris, the patient is medically stable and optimized for discharge. All medications were reviewed and prescriptions were sent to mutually agreed upon pharmacy.

## 2025-02-26 NOTE — PROGRESS NOTE ADULT - SUBJECTIVE AND OBJECTIVE BOX
CARDIOLOGY FOLLOW UP - Dr. Morris    Patient Name: CHRISTOPHER HARKINS    DATE OF SERVICE: 02-26-25 @ 08:03    Patient seen and examined    CC no CP or SOB  pt states he is feeling better     REVIEW OF SYSTEMS:  CONSTITUTIONAL: No fever, weight loss, or fatigue  RESPIRATORY: No cough, wheezing, chills or hemoptysis; No Shortness of Breath  CARDIOVASCULAR: No chest pain, palpitations, passing out, dizziness  GASTROINTESTINAL: No abdominal or epigastric pain. No nausea, vomiting, or hematemesis; No diarrhea or constipation. No melena or hematochezia.      PHYSICAL EXAM:  T(C): 36.5 (02-26-25 @ 05:30), Max: 36.7 (02-25-25 @ 10:30)  HR: 61 (02-26-25 @ 05:30) (55 - 84)  BP: 148/93 (02-26-25 @ 05:30) (118/87 - 149/99)  RR: 18 (02-26-25 @ 05:30) (18 - 18)  SpO2: 98% (02-26-25 @ 05:30) (98% - 100%)  Wt(kg): --  I&O's Summary      Appearance: Normal	  Cardiovascular: Normal S1 S2,RRR, No JVD, No murmurs  Respiratory: Lungs clear to auscultation	  Gastrointestinal:  Soft, Non-tender, + BS	  Extremities: Normal range of motion, No clubbing, cyanosis or edema      Home Medications:  amLODIPine 10 mg oral tablet: 0.5 tab(s) orally once a day (25 Feb 2025 20:03)  aspirin 81 mg oral delayed release tablet: 1 tab(s) orally once a day (10 Feb 2025 18:04)  famotidine 20 mg oral tablet: 1 tab(s) orally once a day (at bedtime) (10 Feb 2025 18:04)  metoprolol succinate 25 mg oral tablet, extended release: 1 tab(s) orally once a day (10 Feb 2025 18:04)      MEDICATIONS  (STANDING):  aspirin enteric coated 81 milliGRAM(s) Oral daily  enoxaparin Injectable 40 milliGRAM(s) SubCutaneous every 24 hours  famotidine    Tablet 20 milliGRAM(s) Oral at bedtime  influenza   Vaccine 0.5 milliLiter(s) IntraMuscular once  lidocaine   4% Patch 1 Patch Transdermal daily  losartan 100 milliGRAM(s) Oral daily  metoprolol succinate ER 25 milliGRAM(s) Oral daily      TELEMETRY: 	  NSR  ECG:  	  RADIOLOGY:   DIAGNOSTIC TESTING:  [ ] Echocardiogram:  [ ]  Catheterization:  [ ] Stress Test:    OTHER: 	    LABS:	 	    Troponin T, High Sensitivity Result: <6 ng/L (02-25 @ 10:57)  CKMB Units: <1.0 ng/mL (02-25 @ 10:57)  CKMB Units: 1.1 ng/mL (02-25 @ 06:00)  Troponin T, High Sensitivity Result: <6 ng/L (02-25 @ 06:00)                          14.4   4.94  )-----------( 232      ( 25 Feb 2025 06:00 )             41.3     02-25    138  |  102  |  18  ----------------------------<  93  3.8   |  21[L]  |  1.03    Ca    8.8      25 Feb 2025 06:00  Phos  2.7     02-25  Mg     2.00     02-25    TPro  6.5  /  Alb  3.9  /  TBili  0.3  /  DBili  x   /  AST  17  /  ALT  15  /  AlkPhos  55  02-25    PT/INR - ( 25 Feb 2025 06:00 )   PT: 12.7 sec;   INR: 1.10 ratio         PTT - ( 25 Feb 2025 06:00 )  PTT:31.1 sec  Lipid Profile:   Hgb A1C:      BNP: Pro-Brain Natriuretic Peptide: 60 pg/mL (02-25-25 @ 06:00)      Creatinine: 1.03 mg/dL (02-25-25 @ 06:00)      Hemoglobin: 14.4 g/dL (02-25-25 @ 06:00)

## 2025-02-26 NOTE — DISCHARGE NOTE NURSING/CASE MANAGEMENT/SOCIAL WORK - NSDCPEWEB_GEN_ALL_CORE
[facility] for Tobacco Control website --- [email or web address] website --- [email or web address]

## 2025-02-26 NOTE — DISCHARGE NOTE PROVIDER - CARE PROVIDER_API CALL
[place] [address]  Phone: [phone]  Fax: [phone]  Follow Up Time:     Otilio Morris  Cardiovascular Disease  [address]  [place] [address]  Phone: [phone]  Fax: [phone]  Follow Up Time:

## 2025-02-26 NOTE — DISCHARGE NOTE NURSING/CASE MANAGEMENT/SOCIAL WORK - NSDCPEEMAIL_GEN_ALL_CORE
[facility] for Tobacco [facility] email [email or web address]

## 2025-02-26 NOTE — PROGRESS NOTE ADULT - ASSESSMENT
39-year-old male history of hypertension recently admitted for hypertensive urgency with ecg chnages now  presenting with chest pain/ htn urgency       #Chest pain.   -in the setting of HTN urgency   -unchanged ecg - abnormality secondary to LVH  -HST x2 negative, CKMB negative   -recent cath from 2/10/25 - with no severe CAD  -recent echo 2/8/25 with mild global lv dysfxn, likely sec to htn   -optimize BP  -Cont asa  -pain now msk/ reproducible  -check limited echo to r/o effusion/pericarditis     #HTN urgency  -resume amlo 10 qd  -cont toprol xl 25 qd  -cont losartan 100 qd  -echo as above  -had recent secondary HTN blood work done  -CHECK renal SONO (left last admission AMA)      dvt ppx

## 2025-02-26 NOTE — DISCHARGE NOTE PROVIDER - NSDCMRMEDTOKEN_GEN_ALL_CORE_FT
amLODIPine 10 mg oral tablet: 0.5 tab(s) orally once a day  aspirin 81 mg oral delayed release tablet: 1 tab(s) orally once a day  famotidine 20 mg oral tablet: 1 tab(s) orally once a day (at bedtime)  losartan 100 mg oral tablet: 1 tab(s) orally once a day  metoprolol succinate 25 mg oral tablet, extended release: 1 tab(s) orally once a day

## 2025-02-26 NOTE — DISCHARGE NOTE NURSING/CASE MANAGEMENT/SOCIAL WORK - PATIENT PORTAL LINK FT
You can access the FollowMyHealth Patient Portal offered by [facility] by registering at the following website: [email or web address]. By joining [facility]’s FollowMyHealth portal, you will also be able to view your health information using other applications (apps) compatible with our system.

## 2025-02-26 NOTE — DISCHARGE NOTE PROVIDER - NSDCCPCAREPLAN_GEN_ALL_CORE_FT
PRINCIPAL DISCHARGE DIAGNOSIS  Diagnosis: Chest pain  Assessment and Plan of Treatment: You were admitted for chest pain due to hypertensive urgency. EKG unchanged from previous EKG. Cardiac enzymes negative. Recent heart catheterization on 2/10/25 unremarkable. Echocardiogram unremarkable. Renal ultrasound was negative. Please follow up with your primary care physician and cardiologist after discharge for continued monitoring and management.

## 2025-02-26 NOTE — DISCHARGE NOTE NURSING/CASE MANAGEMENT/SOCIAL WORK - FINANCIAL ASSISTANCE
[facility] provides services at a reduced cost to those who are determined to be eligible through [facility]’s financial assistance program. Information regarding [facility]’s financial assistance program can be found by going to [email or web address] or by calling [phone].

## 2025-02-26 NOTE — DISCHARGE NOTE PROVIDER - CARE PROVIDERS DIRECT ADDRESSES
[email or web address]

## 2025-05-11 ENCOUNTER — EMERGENCY (EMERGENCY)
Facility: HOSPITAL | Age: 40
LOS: 1 days | End: 2025-05-11
Attending: STUDENT IN AN ORGANIZED HEALTH CARE EDUCATION/TRAINING PROGRAM | Admitting: STUDENT IN AN ORGANIZED HEALTH CARE EDUCATION/TRAINING PROGRAM
Payer: MEDICAID

## 2025-05-11 VITALS
TEMPERATURE: 98 F | HEIGHT: 67 IN | DIASTOLIC BLOOD PRESSURE: 100 MMHG | RESPIRATION RATE: 18 BRPM | OXYGEN SATURATION: 98 % | WEIGHT: 164.91 LBS | SYSTOLIC BLOOD PRESSURE: 170 MMHG | HEART RATE: 85 BPM

## 2025-05-11 VITALS
OXYGEN SATURATION: 98 % | DIASTOLIC BLOOD PRESSURE: 86 MMHG | HEART RATE: 59 BPM | TEMPERATURE: 98 F | SYSTOLIC BLOOD PRESSURE: 146 MMHG | RESPIRATION RATE: 15 BRPM

## 2025-05-11 DIAGNOSIS — Z98.890 OTHER SPECIFIED POSTPROCEDURAL STATES: Chronic | ICD-10-CM

## 2025-05-11 PROCEDURE — 71046 X-RAY EXAM CHEST 2 VIEWS: CPT | Mod: 26

## 2025-05-11 PROCEDURE — 93010 ELECTROCARDIOGRAM REPORT: CPT

## 2025-05-11 PROCEDURE — 71100 X-RAY EXAM RIBS UNI 2 VIEWS: CPT | Mod: 26,RT

## 2025-05-11 PROCEDURE — 99284 EMERGENCY DEPT VISIT MOD MDM: CPT

## 2025-05-11 RX ORDER — KETOROLAC TROMETHAMINE 30 MG/ML
30 INJECTION, SOLUTION INTRAMUSCULAR; INTRAVENOUS ONCE
Refills: 0 | Status: DISCONTINUED | OUTPATIENT
Start: 2025-05-11 | End: 2025-05-11

## 2025-05-11 RX ORDER — LIDOCAINE HYDROCHLORIDE 20 MG/ML
1 JELLY TOPICAL ONCE
Refills: 0 | Status: COMPLETED | OUTPATIENT
Start: 2025-05-11 | End: 2025-05-11

## 2025-05-11 RX ORDER — LIDOCAINE HYDROCHLORIDE 20 MG/ML
1 JELLY TOPICAL
Qty: 2 | Refills: 0
Start: 2025-05-11 | End: 2025-05-15

## 2025-05-11 RX ADMIN — LIDOCAINE HYDROCHLORIDE 1 PATCH: 20 JELLY TOPICAL at 10:46

## 2025-05-11 RX ADMIN — LIDOCAINE HYDROCHLORIDE 1 PATCH: 20 JELLY TOPICAL at 12:13

## 2025-05-11 RX ADMIN — KETOROLAC TROMETHAMINE 30 MILLIGRAM(S): 30 INJECTION, SOLUTION INTRAMUSCULAR; INTRAVENOUS at 10:45

## 2025-05-11 NOTE — ED ADULT NURSE NOTE - OBJECTIVE STATEMENT
Received pt in bed A and OX 3 in NAD resting comfortably, c.o right lower rib pain, reports after heavy lifting, no crepitus noted, no deformities or skin discolorations observed, Medicated as per order.

## 2025-05-11 NOTE — ED PROVIDER NOTE - NSICDXPASTMEDICALHX_GEN_ALL_CORE_FT
PAST MEDICAL HISTORY:  Epistaxis     H/O tonsillitis and Adenoiditis  hospitalized at [facility]  for 2 days April 2021    Marijuana smoker sober for 51 days    MRSA infection right upper extremity

## 2025-05-11 NOTE — ED ADULT NURSE NOTE - NSICDXPASTMEDICALHX_GEN_ALL_CORE_FT
PAST MEDICAL HISTORY:  Epistaxis     H/O tonsillitis and Adenoiditis  hospitalized at [facility]  for 2 days April 2021    Marijuana smoker sober for 51 days    MRSA infection right upper extremity

## 2025-05-11 NOTE — ED PROVIDER NOTE - CLINICAL SUMMARY MEDICAL DECISION MAKING FREE TEXT BOX
38 yo M PMH HTN (compliant with losartan, metoprolol and norvasc), Dysphonia, Cardiac Abnormality (cardiologist Dr. Josh Morris, last Echo done 02/08/2025 EF 40-45%) p/w increased right rib pain x1 week. Patient admits he lifted a box last Sunday and heard a pop in his right rib. Went to [facility] ER where chest xray and ct scan was done. Results negative and patient sent home with ibuprofen 400mg and a muscle relaxer. Patient saw his PCP next day where started on ibuprofen/tylenol rotation. Patient admits to being noncompliant with pain meds due to ongoing pain. Endorses increased pain with straining with bm, moving from side to side and when sitting down. Endorses pain is sharp, nonradiating. Denies chest pain, sob, n/v. Positive breath sounds on exam.   Chest Xray  Toradol, Lidocaine patch  Discharge with Cardiology/PCP follow up

## 2025-05-11 NOTE — ED PROVIDER NOTE - NSFOLLOWUPINSTRUCTIONS_ED_ALL_ED_FT
Patient to follow up with Cardiologist and PCP within 1 week.    Take tylenol (every 6 hours) and ibuprofen (every 8 hours) along with lidocaine patch for ongoing pain management.

## 2025-05-11 NOTE — ED PROVIDER NOTE - ATTENDING APP SHARED VISIT CONTRIBUTION OF CARE
I have discussed the patient's case presentation with the PA. I have also personally performed a face-to-face evaluation of the patient. I agree with the PA's assessment and plan. My additional comments are as below:    Agree that patient's pain is most likely musculoskeletal in nature. Reportedly had negative CXR and CT chest at OSH ED a few days ago. Has point tenderness along right anterior lower ribs, no crepitus, no rash. Will repeat CXR to eval for interval PTX. Pain control, anticipate DC home if no PTX.